# Patient Record
Sex: FEMALE | Race: WHITE | Employment: FULL TIME | URBAN - METROPOLITAN AREA
[De-identification: names, ages, dates, MRNs, and addresses within clinical notes are randomized per-mention and may not be internally consistent; named-entity substitution may affect disease eponyms.]

---

## 2017-07-13 ENCOUNTER — OFFICE VISIT (OUTPATIENT)
Dept: ORTHOPEDIC SURGERY | Age: 52
End: 2017-07-13

## 2017-07-13 VITALS
WEIGHT: 125 LBS | HEART RATE: 65 BPM | SYSTOLIC BLOOD PRESSURE: 132 MMHG | HEIGHT: 63 IN | DIASTOLIC BLOOD PRESSURE: 78 MMHG | BODY MASS INDEX: 22.15 KG/M2

## 2017-07-13 DIAGNOSIS — M75.31 CALCIFIC TENDINITIS OF RIGHT SHOULDER: ICD-10-CM

## 2017-07-13 DIAGNOSIS — M25.511 ACUTE PAIN OF RIGHT SHOULDER: Primary | ICD-10-CM

## 2017-07-13 PROCEDURE — 99213 OFFICE O/P EST LOW 20 MIN: CPT | Performed by: ORTHOPAEDIC SURGERY

## 2017-07-13 RX ORDER — MELOXICAM 15 MG/1
15 TABLET ORAL DAILY
Qty: 90 TABLET | Refills: 3 | Status: SHIPPED | OUTPATIENT
Start: 2017-07-13 | End: 2021-01-20 | Stop reason: ALTCHOICE

## 2017-07-20 ENCOUNTER — OFFICE VISIT (OUTPATIENT)
Dept: ORTHOPEDIC SURGERY | Age: 52
End: 2017-07-20

## 2017-07-20 VITALS
HEART RATE: 63 BPM | WEIGHT: 125 LBS | SYSTOLIC BLOOD PRESSURE: 132 MMHG | HEIGHT: 63 IN | BODY MASS INDEX: 22.15 KG/M2 | DIASTOLIC BLOOD PRESSURE: 78 MMHG

## 2017-07-20 DIAGNOSIS — M75.31 CALCIFIC TENDINITIS OF RIGHT SHOULDER: Primary | ICD-10-CM

## 2017-07-20 PROCEDURE — 99213 OFFICE O/P EST LOW 20 MIN: CPT | Performed by: ORTHOPAEDIC SURGERY

## 2017-07-20 RX ORDER — DICLOFENAC SODIUM 75 MG/1
75 TABLET, DELAYED RELEASE ORAL 2 TIMES DAILY
Qty: 60 TABLET | Refills: 3 | Status: SHIPPED | OUTPATIENT
Start: 2017-07-20 | End: 2018-08-09

## 2018-01-17 ENCOUNTER — OFFICE VISIT (OUTPATIENT)
Dept: ORTHOPEDIC SURGERY | Age: 53
End: 2018-01-17

## 2018-01-17 VITALS
HEART RATE: 63 BPM | HEIGHT: 63 IN | SYSTOLIC BLOOD PRESSURE: 131 MMHG | BODY MASS INDEX: 22.15 KG/M2 | WEIGHT: 125 LBS | DIASTOLIC BLOOD PRESSURE: 78 MMHG

## 2018-01-17 DIAGNOSIS — S63.8X2A SPRAIN OF CARPOMETACARPAL JOINT OF LEFT HAND, INITIAL ENCOUNTER: ICD-10-CM

## 2018-01-17 DIAGNOSIS — M79.642 PAIN OF LEFT HAND: Primary | ICD-10-CM

## 2018-01-17 PROCEDURE — 99213 OFFICE O/P EST LOW 20 MIN: CPT | Performed by: ORTHOPAEDIC SURGERY

## 2018-01-17 RX ORDER — NAPROXEN 500 MG/1
500 TABLET ORAL 2 TIMES DAILY WITH MEALS
Qty: 60 TABLET | Refills: 3 | Status: SHIPPED | OUTPATIENT
Start: 2018-01-17 | End: 2021-01-20 | Stop reason: ALTCHOICE

## 2018-01-17 NOTE — PROGRESS NOTES
Other Topics Concern    Not on file     Social History Narrative    No narrative on file       Family HISTORY    Family History   Problem Relation Age of Onset    High Blood Pressure Father        PHYSICAL EXAM    Vital Signs:  /78 (Site: Left Arm, Position: Sitting)   Pulse 63   Ht 5' 2.99\" (1.6 m)   Wt 125 lb (56.7 kg)   BMI 22.15 kg/m²   General Appearance:  Normal body habitus. Alert and oriented to person, place, and time. Affect:  Normal.   Gait:  Normal. Good balance and coordination. Reflexes:  Intact. Pulses:  Normal.   Skin:  Normal.     Wrist Exam  Hand dominance - Right  Surface Exam  there is some local swelling at the [facility]     1st Dorsal Compartment:  Finkelstein's test:  Negative. [facility] Joint:  Positive for tenderness. Grind test:  Negative. Scaphoid snuffbox:  Nontender. Volar wrist:  Flexor tendons:  Normal.   Marie's test:  Normal.     Dorsal wrist:   No swelling. Extensor tendons normal.     Ulnar side:   ECU tendon:  Normal.   Shuck test:  Negative. Hypothenar snuffbox:  Nontender. LT ligament:  Nontnender. TFCC:  Nontender  No pain. No instability. Wrist Motion Right Left   DF     PF     RD     CMC     UD     SUP     PRO       Strength Right Left    Strength     Allen Pinch          CMC       Hand Exam:  Neurologic Exam:  Reflexes:  Normal.   Motor testing:  Intact in the median, ulnar and radial nerve distributions. Sensation:  Intact in the median, ulnar and radial nerve distributions. Atrophy:  None. Phalen's:  Negative. Median Nerve Compression:  None. Elbow Flexion Test:  Negative. Thumb Exam:  Grind Test:  Negative. Finkelstein's:  Negative. Triggering:  None      Finger Exam:  Range of Motion:  Normal.   Triggering:  None.      Finger/Thumb Range of Motion      Thumb Movement Right Left   IP Extension     IP Flexion     MP Extension     CMC     CMC Radial Abduction     CMC Adduction     CMC Opposition           Index Finger   Range of Motion Right Left   DIP Extension     DIP Flexion     PIP Extension     CMC     PIP Flexion     MP Extension     MP Flexion       Middle Finger   Range of Motion Right Left   DIP Extension     DIP Flexion     PIP Extension     CMC     PIP Flexion     MP Extension     MP Flexion       Ring Finger   Range of Motion Right Left   DIP Extension     DIP Flexion     PIP Extension     CMC     PIP Flexion     MP Extension     MP Flexion       Little Finger   Range of Motion Right Left   DIP Extension     DIP Flexion     PIP Extension     CMC     PIP Flexion     MP Extension     MP Flexion         IMAGING STUDIES    AP navicular lateral views of the left hand and wrist.  These demonstrate radial subluxation of the metacarpal relative to the trapezium. These findings are suggestive of [facility] strain    IMPRESSION    Left CMC  strain of the thumb    PLAN    1. Conservative care options including physical therapy, NSAIDs, splinting, and activity modification were discussed. 2.  The indications for therapeutic injections were discussed. 3.  The indications for additional imaging studies were discussed. 4.  After considering the various options discussed, the patient elected to pursue a course that includes the patient will be fitted with a thumb spica splint which she'll wear for the course next 6 weeks to stabilize the joint. We'll also give her a prescription for  naproxen 500 mg p.o. b.i.d. and then we will reevaluate her in 6 weeks' time to see how she's responded to these measures.

## 2018-01-17 NOTE — PATIENT INSTRUCTIONS
Patient Education        Hand Pain: Care Instructions  Your Care Instructions    Common causes of hand pain are overuse and injuries, such as might happen during sports or home repair projects. Everyday wear and tear, especially as you get older, also can cause hand pain. Most minor hand injuries will heal on their own, and home treatment is usually all you need to do. If you have sudden and severe pain, you may need tests and treatment. Follow-up care is a key part of your treatment and safety. Be sure to make and go to all appointments, and call your doctor if you are having problems. It's also a good idea to know your test results and keep a list of the medicines you take. How can you care for yourself at home? · Take pain medicines exactly as directed. ¨ If the doctor gave you a prescription medicine for pain, take it as prescribed. ¨ If you are not taking a prescription pain medicine, ask your doctor if you can take an over-the-counter medicine. · Rest and protect your hand. Take a break from any activity that may cause pain. · Put ice or a cold pack on your hand for 10 to 20 minutes at a time. Put a thin cloth between the ice and your skin. · Prop up the sore hand on a pillow when you ice it or anytime you sit or lie down during the next 3 days. Try to keep it above the level of your heart. This will help reduce swelling. · If your doctor recommends a sling, splint, or elastic bandage to support your hand, wear it as directed. When should you call for help? Call 911 anytime you think you may need emergency care. For example, call if:  ? · Your hand turns cool or pale or changes color. ?Call your doctor now or seek immediate medical care if:  ? · You cannot move your hand. ? · Your hand pops, moves out of its normal position, and then returns to its normal position. ? · You have signs of infection, such as:  ¨ Increased pain, swelling, warmth, or redness.   ¨ Red streaks leading from the sore

## 2018-08-09 ENCOUNTER — OFFICE VISIT (OUTPATIENT)
Dept: ORTHOPEDIC SURGERY | Age: 53
End: 2018-08-09

## 2018-08-09 VITALS
SYSTOLIC BLOOD PRESSURE: 118 MMHG | WEIGHT: 130 LBS | HEART RATE: 79 BPM | DIASTOLIC BLOOD PRESSURE: 73 MMHG | BODY MASS INDEX: 23.04 KG/M2 | HEIGHT: 63 IN

## 2018-08-09 DIAGNOSIS — M25.521 RIGHT ELBOW PAIN: Primary | ICD-10-CM

## 2018-08-09 DIAGNOSIS — M77.11 RIGHT LATERAL EPICONDYLITIS: ICD-10-CM

## 2018-08-09 PROCEDURE — MISCD83 BANDIT: Performed by: ORTHOPAEDIC SURGERY

## 2018-08-09 PROCEDURE — 99203 OFFICE O/P NEW LOW 30 MIN: CPT | Performed by: ORTHOPAEDIC SURGERY

## 2018-08-09 NOTE — PROGRESS NOTES
expected resolution. Today we will supply her with a tennis elbow forearm strap. With her activity level I feel confident that with simple activity changes she will be able to get this to resolve even though it may take several months. She will be very particularly careful when working with her horses. Appropriate followup plans are discussed with the patient depending on the level of progress with the conservative care. Procedures    Bandit $30     Patient was supplied a Band-It Strap. This retail item was supplied to provide functional support and assist in protecting the affected area. Verbal and written instructions for the use of and application of this item were provided. The patient was educated and fit by a healthcare professional with expert knowledge and specialization in brace application. They were instructed to contact the office immediately should the equipment result in increased pain, decreased sensation, increased swelling or worsening of the condition.

## 2020-10-06 ENCOUNTER — OFFICE VISIT (OUTPATIENT)
Dept: ORTHOPEDIC SURGERY | Age: 55
End: 2020-10-06
Payer: COMMERCIAL

## 2020-10-06 VITALS — WEIGHT: 130 LBS | BODY MASS INDEX: 23.04 KG/M2 | HEIGHT: 63 IN

## 2020-10-06 PROCEDURE — 99213 OFFICE O/P EST LOW 20 MIN: CPT | Performed by: ORTHOPAEDIC SURGERY

## 2020-10-06 PROCEDURE — L3924 HFO WITHOUT JOINTS PRE OTS: HCPCS | Performed by: ORTHOPAEDIC SURGERY

## 2020-10-06 NOTE — PROGRESS NOTES
Chief Complaint   Patient presents with    Hand Pain     LEFT  THUMB PAIN/ARTHRITIS, INJURY IN PAST, FELL IN BARN. HISTORY OF PRESENT Marcia Smith is a  right-hand-dominant patient here for evaluation of pain in her Left thumb, at the MCP joint, that began approximately 2.5 years ago. Patient fell in a barn landing awkwardly on her left thumb, she saw an orthopedist at that point who suggested that she may have sprained the thumb and may have some arthritis in the thumb. She was managed nonoperatively. She has tried conservative measures since that point with use of a brace intermittently, activity modifications and NSAIDs. She states that she is never been able to regain the strength of  or pinching with the thumb. She denies triggering. ROS:  ROS neg except for positives in HPI    PHYSICAL EXAMINATION:   Gen/Psych: Examination reveals a pleasant individual in no acute distress. The patient is oriented to time, place and person. The patient's mood and affect are appropriate. Lymph: The lymphatic examination bilaterally reveals all areas to be without enlargement or induration. Skin intact without lymphadenopathy, discoloration, or abnormal temperature. Vascular: There is intact, symmetric circulation in both upper extremities. Musculoskeletal       Cervical spine, shoulders, elbows, wrist:  satisfactory pain-free range of motion,                                                                           strength, and stability        Left thumb:  Tenderness is elicited with palpation of the MP joint, especially ulnarly. Stressing of the MP joint reveals laxity ulnarly consistent with UCL tear or laxity. Unable to assess full endpoint and this re-creates her pain. No pain radially at the MP joint. No pain at the thumb [facility] joint today. Negative grind test.  Negative Finkelstein test.  No thumb triggering.       DIAGNOSTIC TESTING:   3 views, left hand, impression:  Moderate narrowing thumb CMC joint, small amount of subluxation. 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 Procedures    Hely Weber [facility] Controller Plus     Patient was prescribed a Hely Weber [facility] Controller Plus Thumb Splint. The left thumb will require stabilization / immobilization from this semi-rigid / rigid orthosis to improve their function. The orthosis will assist in protecting the affected area, provide functional support and facilitate healing. The patient was educated and fit by a healthcare professional with expert knowledge and specialization in brace application while under the direct supervision of the physician. Verbal and written instructions for the use of and application of this item were provided. They were instructed to contact the office immediately should the brace result in increased pain, decreased sensation, increased swelling or worsening of the condition. In addition MRI left hand, specifically the thumb to evaluate for UCL tear at the MCP joint, rule out Stener lesion. Follow-up after MRI. All questions and concerns were addressed today. Patient is in agreement with the plan. Ree Edgar MD  Hand & Upper Extremity Surgery  [address]  A partner of [facility] ([facility])        Please note that this transcription was created using voice recognition software. Any errors are unintentional and may be due to voice recognition transcription.

## 2020-10-29 ENCOUNTER — TELEPHONE (OUTPATIENT)
Dept: ORTHOPEDIC SURGERY | Age: 55
End: 2020-10-29

## 2020-10-29 NOTE — TELEPHONE ENCOUNTER
General Question     Subject: MRI ORDERS  Patient and /or Facility Request: Vanessa Enriquez  Contact Number: [phone]

## 2020-11-05 ENCOUNTER — TELEPHONE (OUTPATIENT)
Dept: ORTHOPEDIC SURGERY | Age: 55
End: 2020-11-05

## 2020-12-12 ENCOUNTER — OFFICE VISIT (OUTPATIENT)
Dept: ORTHOPEDIC SURGERY | Age: 55
End: 2020-12-12
Payer: COMMERCIAL

## 2020-12-12 VITALS — WEIGHT: 135 LBS | HEIGHT: 63 IN | BODY MASS INDEX: 23.92 KG/M2

## 2020-12-12 PROCEDURE — 99213 OFFICE O/P EST LOW 20 MIN: CPT | Performed by: NURSE PRACTITIONER

## 2020-12-12 NOTE — PROGRESS NOTES
Subjective    Patient ID: Kennedy Dodge is a 50 y.o..  female. Shoulder Pain: Patient complaints of right shoulder pain. The patient reports that she woke up Thursday night with severe shoulder pain in the middle of the night. Earlier that day, horse had yanked her arm but she is unsure if that was related. She did not have pain at the time of that injury. She reports that the shoulder pain is global but most severe posteriorly. She denies any pain radiating down her arm and denies any numbness or tingling. She is unsure if she has any weakness in the arm as she has avoided using this shoulder and arm. She has been taking ibuprofen as well as using ice with minimal relief. She is right-handed. 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 Pain Assessment  Location of Pain: Shoulder  Location Modifiers: Right  Severity of Pain: 10  Quality of Pain: Sharp, Aching  Duration of Pain: Persistent  Frequency of Pain: Constant  Date Pain First Started: 12/10/20  Aggravating Factors: Other (Comment)(movement)  Limiting Behavior: Yes  Relieving Factors: Rest, Ice  Result of Injury: Yes  Work-Related Injury: No  Are there other pain locations you wish to document?: No    Patient's medications, allergies, past medical, surgical, social and family histories were reviewed and updated as appropriate. Physical Exam  Constitutional:  Pt well groomed, no acute distress, well developed, no obvious deformities  Vitals:    12/12/20 1103   Weight: 135 lb (61.2 kg)   Height: 5' 3\" (1.6 m)     -Oriented to person, place, and time  -mood and affect are appropriate    Shoulder Exam:  Examination of the right shoulder shows:  -There is not a deformity.    -There is not erythema.    -There is not soft tissue swelling.    -Deltoid region is not tender to palpation. AC Joint is not tender to palpation. Clavicle is not tender to palpation. Bicipital Groove is  tender to palpation. Pectoralis  is not tender to palpation. Scapula/ trapezius is  tender to palpation.  -Difficult to discern if patient has shoulder weakness with rotator cuff testing or limitations due to pain  -There is  pain with supraspinatus testing.   -Shoulder ROM -full range of motion external rotation, internal rotation abduction and adduction. Forward flexion limited significantly due to pain, patient able to tolerate passive forward flexion to approximately 70 degrees, difficulty maintaining this position due to pain    Contralateral Exam:  -No obvious deformities  -No abrasions or cellulitis noted, NVI   -Full ROM   -No joint laxity  -no palpable tenderness noted    Neurological:   -There is not any complaints of numbness and tingling.    -Motor and sensory is at median, radial and ulnar nerve distributions. -NVI to upper extremities bilaterally. Skin:  No abrasions, lesions, cellulitic changes, obvious deformities noted     Xray:  4 view (AP/Lat/Axillary/Y) of right shoulder show: No acute fractures or deformities; osteophyte noted    Assessment:  right shoulder strain    Plan: I discussed treatment options with the patient including pursuing an MRI at this time due to a possible acute injury with related severe pain. Another option that we discussed was conservative treatment. After discussion, the patient and I decided upon pursuing a conservative course of treatment including a prescription for Voltaren, physical therapy, rest and ice. She will follow up with Dr. Nivia Valladares in 2 weeks. I did discuss with her that if her pain does not improve with this conservative treatment or worsens to contact us and at that time we would likely pursue an MRI to rule out a rotator cuff tear. No orders of the defined types were placed in this encounter.

## 2020-12-28 ENCOUNTER — OFFICE VISIT (OUTPATIENT)
Dept: ORTHOPEDIC SURGERY | Age: 55
End: 2020-12-28
Payer: COMMERCIAL

## 2020-12-28 VITALS — BODY MASS INDEX: 24.84 KG/M2 | WEIGHT: 135 LBS | HEIGHT: 62 IN

## 2020-12-28 PROCEDURE — 99213 OFFICE O/P EST LOW 20 MIN: CPT | Performed by: ORTHOPAEDIC SURGERY

## 2020-12-28 NOTE — PROGRESS NOTES
I am evaluating this patient as a consult at the request of LORNA Rosa - CNP  [address],  [address] [place] [address]     Chief Complaint:  New Patient [facility] RIGHT SHOULDER )      History of Present Illness:  Ortiz Ahmadi is a  50 y.o. right hand dominant female here regarding 2 weeks of right shoulder pain, [place] 2 weeks ago horse pulled her arm jerking away from her body, she woke the next day with significant pain and stiffness, over the last 2 weeks range of motion has improved but she is continued to have pain especially with overhead activity. Denies numbness and tingling down the arm. She does have history of right shoulder video arthroscopy with distal clavicle excision by Dr. Elier Powell quite a few years ago. Denies previous rotator cuff repair. She was evaluated at after-hours clinic where she was provided diclofenac and a home exercise program.  Patient has been doing exercises and taking medicine with improvement in range of motion but not pain or strength. 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   Past Surgical History:   Procedure Laterality Date    ANTERIOR CRUCIATE LIGAMENT REPAIR      right     SHOULDER ARTHROSCOPY  4/26/12    right shoulder subacromial decompression, right shoulder distal clavicle resection     Social History     Socioeconomic History    Marital status:      Spouse name: None    Number of children: None    Years of education: None    Highest education level: None   Occupational History    None   Social Needs    Financial resource strain: None    Food insecurity     Worry: None     Inability: None    Transportation needs     Medical: None     Non-medical: None   Tobacco Use    Smoking status: Current Every Day Smoker     Packs/day: 0.50     Years: 20.00     Pack years: 10.00     Types: Cigarettes    Smokeless tobacco: Never Used   Substance and Sexual Activity    Alcohol use: Yes     Comment: rarely    Drug use: No    Sexual activity: Yes     Partners: Male   Lifestyle    Physical activity     Days per week: None     Minutes per session: None    Stress: None   Relationships    Social connections     Talks on phone: None     Gets together: None     Attends [employer] service: None     Active member of club or organization: None     Attends meetings of clubs or organizations: None     Relationship status: None    Intimate partner violence     Fear of current or ex partner: None     Emotionally abused: None     Physically abused: None     Forced sexual activity: None   Other Topics Concern    None   Social History Narrative    None     Allergies   Allergen Reactions    Cephalexin     Keflex [Cephalexin]        Review of Systems:  Constitutional: negative  Respiratory: negative  Cardiovascular: negative  Musculoskeletal:negative except for New Patient [facility]ISE [facility] RIGHT SHOULDER )    Relevant review of systems reviewed and available in the patient's chart in media tab    Vital Signs:  Vitals:    12/28/20 1259   Weight: 135 lb (61.2 kg)   Height: 5' 2\" (1.575 m)         General Exam:   Constitutional: Patient is adequately groomed with no evidence of malnutrition  Vascular: Examination reveals no swelling or calf tenderness. Peripheral pulses are palpable and 2+. Neurological: The patient has good coordination. There is no weakness or sensory deficit. PHYSICAL EXAMINATION: Inspection of the right shoulder reveals warm, dry, intact skin. There is no adenopathy. The distal neurovascular exam is grossly intact.   Range of motion is 180 degrees of active forward flexion, functional external rotation to her occiput, functional internal rotation T12. External rotation with arm at her side about 45 degrees. She has a positive empty can test significant for both pain and weakness with supraspinatus testing. No pain or weakness with subscapularis or infraspinatus testing. No tenderness to palpation in the bicipital groove, negative speeds and Yergason's. Strength is graded 5/5 in all other muscle groups. Examination of the contralateral shoulder reveals no atrophy or deformity. The skin is warm and dry. Range of motion is within normal limits. There is no focal tenderness with palpation. Provocative SLAP, biceps tension, apprehension AC joint or rotator cuff tests are negative. Strength is graded 5/5 in all muscle groups. The distal neurovascular exam is grossly intact. Cervical spine: The skin is warm and dry. There is no swelling, warmth, or erythema. Range of motion is within normal limits. There is no paraspinal or spinous process tenderness. Spurling's sign is negative and did not produce shoulder pain. The distal neurovascular exam is grossly intact. Additional Comments: Sensation is intact to light touch throughout the median, ulnar and radial nerve distribution. Able to wiggle fingers, give thumbs up, A-OK and cross index and middle fingers. X-RAYS:  Four views of the right shoulder are reviewed. There is no periosteal reaction, medullary lesions, or osteopenia. Glenohumeral space is well maintained, the acromioclavicular joint space shows previous Jessie procedure. The Acromiohumeral space is well-maintained. Calcific tendinosis at the insertion of the supraspinatus on the greater tuberosity is visible. She has a type I acromion from previous subacromial decompression. the lung fields are clear. Assessment: Right shoulder injury concerning for rotator cuff tear, calcific tendinosis    Impression:  Encounter Diagnosis   Name Primary?  Acute pain of right shoulder Yes       Office Procedures:  Orders Placed This Encounter   Procedures    MRI SHOULDER RIGHT WO CONTRAST     Standing Status:   Future     Standing Expiration Date:   12/28/2021     Scheduling Instructions:      Katherine Goetz     No orders of the defined types were placed in this encounter. Treatment Plan: Based on patient's history and physical exam I'm concerned about rotator cuff tear therefore I would like to obtain an MRI to further evaluate. Once the MRI is obtained the patient will follow up with me for further evaluation and discussion. Patient agrees with this plan, all of their questions were answered best of our ability and to their satisfaction. Continue with home exercise program and diclofenac until after MRI.       Timothy Ybarra

## 2021-01-07 ENCOUNTER — OFFICE VISIT (OUTPATIENT)
Dept: ORTHOPEDIC SURGERY | Age: 56
End: 2021-01-07
Payer: COMMERCIAL

## 2021-01-07 VITALS — WEIGHT: 135 LBS | BODY MASS INDEX: 24.84 KG/M2 | HEIGHT: 62 IN

## 2021-01-07 DIAGNOSIS — M25.562 LEFT KNEE PAIN, UNSPECIFIED CHRONICITY: Primary | ICD-10-CM

## 2021-01-07 PROCEDURE — 99213 OFFICE O/P EST LOW 20 MIN: CPT | Performed by: ORTHOPAEDIC SURGERY

## 2021-01-07 NOTE — PROGRESS NOTES
I am evaluating this patient as a consult at the request of No referring provider defined for this encounter. Chief Complaint:  Knee Pain (CK LEFT KNEE)      History of Present Illness:  Bishop Aranda is a 50 y.o. female here regarding left knee injury, on January 4 she was cleaning horse stalls when she twisted awkwardly felt a snap on the medial aspect of her left knee. She now has pain with twisting and squatting. It is sharp in nature. She has been taking ibuprofen, using ice and avoiding aggravating activities with no significant improvement. She has swelling as well. Pain Assessment:       Medical History:  No past medical history on file.   Past Surgical History:   Procedure Laterality Date    ANTERIOR CRUCIATE LIGAMENT REPAIR      right     SHOULDER ARTHROSCOPY  4/26/12    right shoulder subacromial decompression, right shoulder distal clavicle resection     Social History     Socioeconomic History    Marital status:      Spouse name: None    Number of children: None    Years of education: None    Highest education level: None   Occupational History    None   Social Needs    Financial resource strain: None    Food insecurity     Worry: None     Inability: None    Transportation needs     Medical: None     Non-medical: None   Tobacco Use    Smoking status: Current Every Day Smoker     Packs/day: 0.50     Years: 20.00     Pack years: 10.00     Types: Cigarettes    Smokeless tobacco: Never Used   Substance and Sexual Activity    Alcohol use: Yes     Comment: rarely    Drug use: No    Sexual activity: Yes     Partners: Male   Lifestyle    Physical activity     Days per week: None     Minutes per session: None    Stress: None   Relationships    Social connections     Talks on phone: None     Gets together: None     Attends [employer] service: None     Active member of club or organization: None     Attends meetings of clubs or organizations: None     Relationship status: None  Intimate partner violence     Fear of current or ex partner: None     Emotionally abused: None     Physically abused: None     Forced sexual activity: None   Other Topics Concern    None   Social History Narrative    None     Allergies   Allergen Reactions    Cephalexin     Keflex [Cephalexin]        Review of Systems:  Constitutional: negative  Respiratory: negative  Cardiovascular: negative  Musculoskeletal:negative except for Knee Pain (CK LEFT KNEE)    Relevant review of systems reviewed and available in the patient's chart in media tab    Vital Signs:  Vitals:    01/07/21 1340   Weight: 135 lb (61.2 kg)   Height: 5' 2\" (1.575 m)         General Exam:   Constitutional: Patient is adequately groomed with no evidence of malnutrition  Mental Status: The patient is oriented to time, place and person. The patient's mood and affect are appropriate. Vascular: Examination reveals no swelling or calf tenderness. Peripheral pulses are palpable and 2+. left Knee Examination  Inspection:   No gross deformities noted. mild swelling noted. No erythema or ecchymosis. Skin is intact. Palpation: positive Tenderness to palpation along the medial joint line, negative Tenderness to palpation along lateral joint line, positive Tenderness to palpation along medial and lateral facets of undersurface of the patella    Range of Motion:  0-125    Strength:  Able to do SLR    Special Tests:  ACL, MCL, PCL, LCL are intact with stress exam.  There negative crepitus noted with range of motion under the patella. A positive grind test.  positive Tracy's and Apley compression test.       Skin: There are no rashes, ulcerations or lesions. Gait: Normal gait    Reflex: not tested    Additional Examinations:  Right Lower Extremity: Examination of the right lower extremity does not show any tenderness, deformity or injury. Range of motion is unremarkable. There is no gross instability.   There are no rashes, ulcerations or lesions. Strength and tone are normal.      LUMBAR SPINE: The skin is warm and dry. There is no swelling, warmth, or erythema. Range of motion is within normal limits. There is no paraspinal or spinous process tenderness. Ipsilateral and contralateral straight leg raising tests are negative. The distal neurovascular exam is grossly intact and symmetric. X-RAYS: 4 views weightbearing AP, lateral. PA and sunrise of the left knee were obtained and reviewed, they show no periosteal reaction, medullary lesions, or osteopenia. Joint spaces are well maintained. No evidence of fracture or dislocation. Assessment : Left knee pain concerning for medial meniscus tear    Impression:  Encounter Diagnosis   Name Primary?  Left knee pain, unspecified chronicity Yes       Office Procedures:  Orders Placed This Encounter   Procedures    XR KNEE LEFT (MIN 4 VIEWS)     Order Specific Question:   Reason for exam:     Answer:   LEFT KNEE PAIN     No orders of the defined types were placed in this encounter. Treatment Plan: Based on patient's history and physical exam I'm concerned about medial meniscus tear therefore I would like to obtain an MRI to further evaluate, her PCP has already ordered an MRI and she has that scheduled for this weekend. Once the MRI is obtained the patient will follow up with me for further evaluation and discussion. Patient agrees with this plan, all of their questions were answered best of our ability and to their satisfaction.           Estelita Orantes

## 2021-01-12 ENCOUNTER — OFFICE VISIT (OUTPATIENT)
Dept: ORTHOPEDIC SURGERY | Age: 56
End: 2021-01-12
Payer: COMMERCIAL

## 2021-01-12 VITALS — BODY MASS INDEX: 24.84 KG/M2 | WEIGHT: 135 LBS | HEIGHT: 62 IN

## 2021-01-12 DIAGNOSIS — M67.52 PLICA SYNDROME, LEFT KNEE: Primary | ICD-10-CM

## 2021-01-12 PROCEDURE — L1812 KO ELASTIC W/JOINTS PRE OTS: HCPCS | Performed by: ORTHOPAEDIC SURGERY

## 2021-01-12 PROCEDURE — 99214 OFFICE O/P EST MOD 30 MIN: CPT | Performed by: ORTHOPAEDIC SURGERY

## 2021-01-12 NOTE — PROGRESS NOTES
Chief Complaint:  Follow-up (TR MRI ON 1/9)      SUBJECTIVE:  Celi Barrios is a 50 y.o. female who returns today to review MRI results of the left knee, MRI was obtained by her PCP and she is here to review those results. on January 4 she was cleaning horse stalls when she twisted awkwardly felt a snap on the medial aspect of her left knee. She now has pain with twisting and squatting. It is sharp in nature. Continues to have 4 out of 10 sharp anterior pain. Pain Assessment:         Medical History:  No past medical history on file. Past Surgical History:   Procedure Laterality Date    ANTERIOR CRUCIATE LIGAMENT REPAIR      right     SHOULDER ARTHROSCOPY  4/26/12    right shoulder subacromial decompression, right shoulder distal clavicle resection     Current Outpatient Medications   Medication Sig Dispense Refill    diclofenac (VOLTAREN) 50 MG EC tablet Take 1 tablet by mouth 2 times daily (with meals) 60 tablet 0    naproxen (NAPROSYN) 500 MG tablet Take 1 tablet by mouth 2 times daily (with meals) 60 tablet 3    meloxicam (MOBIC) 15 MG tablet Take 1 tablet by mouth daily (Patient not taking: Reported on 10/6/2020) 90 tablet 3     No current facility-administered medications for this visit. Allergies   Allergen Reactions    Cephalexin     Keflex [Cephalexin]        Review of Systems:  Constitutional: negative  Respiratory: negative  Cardiovascular: negative  Musculoskeletal:negative except for Follow-up (TR MRI ON 1/9)    Relevant review of systems reviewed and available in the patient's chart in media tab    General Exam:   Constitutional: Patient is adequately groomed with no evidence of malnutrition  Mental Status: The patient is oriented to time, place and person. The patient's mood and affect are appropriate. Vascular: Examination reveals no swelling or calf tenderness. Peripheral pulses are palpable and 2+.     OBJECTIVE:  Vital Signs:  Vitals:    01/12/21 1349   Weight: 135 lb (61.2 kg)   Height: 5' 2\" (1.575 m)       Appearance: alert, well appearing, and in no distress, oriented to person, place, and time and normal appearing weight. Physical exam:   Continues to have tenderness to palpation on the medial and anterior aspect of the knee, she does have mechanical symptoms with plica palpable on flexion extension. Positive grind test, positive Tracy's. Tolerates range of motion 0 to 130 degrees. Ambulates with a normal gait. MCL is stable with stress exam but tender. Distal neurovascular exam is intact. X-RAYS: 4 views weightbearing AP, lateral. PA and sunrise of the left knee were reviewed, they show no periosteal reaction, medullary lesions, or osteopenia. Joint spaces are well maintained. No evidence of fracture or dislocation. MRI images of the left knee were reviewed and show:  Hypertrophic medial plica and synovitis  Medial lateral meniscus are intact, no evidence of cruciate or collateral ligament tear  MCL sprain, grade 1    Assessment : Left knee medial plica syndrome, synovitis, MCL sprain    Impression:  Encounter Diagnosis   Name Primary?  Plica syndrome, left knee Yes       Office Procedures:  No orders of the defined types were placed in this encounter. No orders of the defined types were placed in this encounter. Treatment Plan: I had a lengthy discussion with the patient about the pathophysiology of medial plica syndrome and synovitis and their treatment options. The 1st option would be to pursue no further treatment and continue living with their knee pain and dysfunction. The 2nd treatment option would be to pursue conservative treatment, including physical therapy, injections and oral medications.  The 3rd option would be to pursue surgical intervention including left knee video arthroscopy excision of medial plica and partial synovectomy, evaluation of chondral and meniscal surfaces with work as needed  Risks and benefits of each option were discussed in great detail with the patient, at this time the patient would like to pursue surgical intervention. I spent 25+ minutes face to face with the patient including 13+ minutes discussing and answering questions regarding the risks, benefits, and complications of left knee video arthroscopy excision of medial plica and partial synovectomy, evaluation of chondral and meniscal surfaces with work as needed in detail. We talked about the risks of surgery which include but are not limited to infection, bleeding, nerve injury resulting in motor or sensory loss, DVT, RSD, persistent pain, loss of motion, functional instability, and need for further surgery. At no time were any guarantees implied or stated. The patient acknowledged these risks and electively reviewed and signed the consent form. Surgery will be scheduled for a mutually convenient time. Patient will obtain medical clearance from their PCP prior to surgery. An economy hinged brace was provided today to assist in stability while she heals from the MCL sprain.     Diane Shook

## 2021-01-13 DIAGNOSIS — M67.52 PLICA SYNDROME, LEFT KNEE: Primary | ICD-10-CM

## 2021-01-20 ENCOUNTER — TELEPHONE (OUTPATIENT)
Dept: ORTHOPEDIC SURGERY | Age: 56
End: 2021-01-20

## 2021-01-25 ENCOUNTER — HOSPITAL ENCOUNTER (OUTPATIENT)
Age: 56
Discharge: HOME | End: 2021-01-25
Payer: COMMERCIAL

## 2021-01-25 LAB — SARS-COV-2, NAAT: NOT DETECTED

## 2021-01-25 PROCEDURE — U0002 COVID-19 LAB TEST NON-CDC: HCPCS

## 2021-01-25 NOTE — PROGRESS NOTES
Contacted pt concerning covid testing, instructed to go to [facility] to get testing done

## 2021-01-27 ENCOUNTER — ANESTHESIA (OUTPATIENT)
Dept: OPERATING ROOM | Age: 56
End: 2021-01-27
Payer: COMMERCIAL

## 2021-01-27 ENCOUNTER — HOSPITAL ENCOUNTER (OUTPATIENT)
Age: 56
Setting detail: OUTPATIENT SURGERY
Discharge: HOME | End: 2021-01-27
Attending: ORTHOPAEDIC SURGERY | Admitting: ORTHOPAEDIC SURGERY
Payer: COMMERCIAL

## 2021-01-27 ENCOUNTER — ANESTHESIA EVENT (OUTPATIENT)
Dept: OPERATING ROOM | Age: 56
End: 2021-01-27
Payer: COMMERCIAL

## 2021-01-27 VITALS
BODY MASS INDEX: 24.84 KG/M2 | SYSTOLIC BLOOD PRESSURE: 132 MMHG | HEART RATE: 72 BPM | WEIGHT: 135 LBS | DIASTOLIC BLOOD PRESSURE: 78 MMHG | HEIGHT: 62 IN | TEMPERATURE: 98 F | OXYGEN SATURATION: 97 % | RESPIRATION RATE: 16 BRPM

## 2021-01-27 VITALS
TEMPERATURE: 98.2 F | DIASTOLIC BLOOD PRESSURE: 54 MMHG | RESPIRATION RATE: 11 BRPM | SYSTOLIC BLOOD PRESSURE: 88 MMHG | OXYGEN SATURATION: 100 %

## 2021-01-27 DIAGNOSIS — M67.52 PLICA SYNDROME, LEFT KNEE: Primary | ICD-10-CM

## 2021-01-27 PROCEDURE — 2709999900 HC NON-CHARGEABLE SUPPLY: Performed by: ORTHOPAEDIC SURGERY

## 2021-01-27 PROCEDURE — 2500000003 HC RX 250 WO HCPCS: Performed by: NURSE ANESTHETIST, CERTIFIED REGISTERED

## 2021-01-27 PROCEDURE — 6370000000 HC RX 637 (ALT 250 FOR IP): Performed by: ANESTHESIOLOGY

## 2021-01-27 PROCEDURE — 2720000010 HC SURG SUPPLY STERILE: Performed by: ORTHOPAEDIC SURGERY

## 2021-01-27 PROCEDURE — 7100000001 HC PACU RECOVERY - ADDTL 15 MIN: Performed by: ORTHOPAEDIC SURGERY

## 2021-01-27 PROCEDURE — 6370000000 HC RX 637 (ALT 250 FOR IP)

## 2021-01-27 PROCEDURE — 7100000000 HC PACU RECOVERY - FIRST 15 MIN: Performed by: ORTHOPAEDIC SURGERY

## 2021-01-27 PROCEDURE — 6360000002 HC RX W HCPCS: Performed by: NURSE ANESTHETIST, CERTIFIED REGISTERED

## 2021-01-27 PROCEDURE — 2500000003 HC RX 250 WO HCPCS

## 2021-01-27 PROCEDURE — 2580000003 HC RX 258: Performed by: ANESTHESIOLOGY

## 2021-01-27 PROCEDURE — 7100000011 HC PHASE II RECOVERY - ADDTL 15 MIN: Performed by: ORTHOPAEDIC SURGERY

## 2021-01-27 PROCEDURE — 7100000010 HC PHASE II RECOVERY - FIRST 15 MIN: Performed by: ORTHOPAEDIC SURGERY

## 2021-01-27 PROCEDURE — 3600000014 HC SURGERY LEVEL 4 ADDTL 15MIN: Performed by: ORTHOPAEDIC SURGERY

## 2021-01-27 PROCEDURE — 3700000000 HC ANESTHESIA ATTENDED CARE: Performed by: ORTHOPAEDIC SURGERY

## 2021-01-27 PROCEDURE — 3700000001 HC ADD 15 MINUTES (ANESTHESIA): Performed by: ORTHOPAEDIC SURGERY

## 2021-01-27 PROCEDURE — 3600000004 HC SURGERY LEVEL 4 BASE: Performed by: ORTHOPAEDIC SURGERY

## 2021-01-27 PROCEDURE — 2500000003 HC RX 250 WO HCPCS: Performed by: ORTHOPAEDIC SURGERY

## 2021-01-27 RX ORDER — LIDOCAINE HYDROCHLORIDE 10 MG/ML
2 INJECTION, SOLUTION INFILTRATION; PERINEURAL
Status: COMPLETED | OUTPATIENT
Start: 2021-01-27 | End: 2021-01-27

## 2021-01-27 RX ORDER — DEXAMETHASONE SODIUM PHOSPHATE 4 MG/ML
INJECTION, SOLUTION INTRA-ARTICULAR; INTRALESIONAL; INTRAMUSCULAR; INTRAVENOUS; SOFT TISSUE PRN
Status: DISCONTINUED | OUTPATIENT
Start: 2021-01-27 | End: 2021-01-27 | Stop reason: SDUPTHER

## 2021-01-27 RX ORDER — SODIUM CHLORIDE, SODIUM LACTATE, POTASSIUM CHLORIDE, CALCIUM CHLORIDE 600; 310; 30; 20 MG/100ML; MG/100ML; MG/100ML; MG/100ML
INJECTION, SOLUTION INTRAVENOUS CONTINUOUS
Status: DISCONTINUED | OUTPATIENT
Start: 2021-01-27 | End: 2021-01-27 | Stop reason: HOSPADM

## 2021-01-27 RX ORDER — HYDROCODONE BITARTRATE AND ACETAMINOPHEN 5; 325 MG/1; MG/1
1 TABLET ORAL EVERY 6 HOURS PRN
Qty: 28 TABLET | Refills: 0 | Status: SHIPPED | OUTPATIENT
Start: 2021-01-27 | End: 2021-02-03

## 2021-01-27 RX ORDER — OXYCODONE HYDROCHLORIDE AND ACETAMINOPHEN 5; 325 MG/1; MG/1
2 TABLET ORAL PRN
Status: COMPLETED | OUTPATIENT
Start: 2021-01-27 | End: 2021-01-27

## 2021-01-27 RX ORDER — ONDANSETRON 2 MG/ML
4 INJECTION INTRAMUSCULAR; INTRAVENOUS EVERY 30 MIN PRN
Status: DISCONTINUED | OUTPATIENT
Start: 2021-01-27 | End: 2021-01-27 | Stop reason: HOSPADM

## 2021-01-27 RX ORDER — PROPOFOL 10 MG/ML
INJECTION, EMULSION INTRAVENOUS PRN
Status: DISCONTINUED | OUTPATIENT
Start: 2021-01-27 | End: 2021-01-27 | Stop reason: SDUPTHER

## 2021-01-27 RX ORDER — CLINDAMYCIN PHOSPHATE 900 MG/50ML
900 INJECTION INTRAVENOUS EVERY 8 HOURS
Status: COMPLETED | OUTPATIENT
Start: 2021-01-27 | End: 2021-01-27

## 2021-01-27 RX ORDER — DIPHENHYDRAMINE HYDROCHLORIDE 50 MG/ML
6.25 INJECTION INTRAMUSCULAR; INTRAVENOUS
Status: DISCONTINUED | OUTPATIENT
Start: 2021-01-27 | End: 2021-01-27 | Stop reason: HOSPADM

## 2021-01-27 RX ORDER — ONDANSETRON 2 MG/ML
INJECTION INTRAMUSCULAR; INTRAVENOUS PRN
Status: DISCONTINUED | OUTPATIENT
Start: 2021-01-27 | End: 2021-01-27 | Stop reason: SDUPTHER

## 2021-01-27 RX ORDER — BUPIVACAINE HYDROCHLORIDE 2.5 MG/ML
INJECTION, SOLUTION INFILTRATION; PERINEURAL PRN
Status: DISCONTINUED | OUTPATIENT
Start: 2021-01-27 | End: 2021-01-27 | Stop reason: ALTCHOICE

## 2021-01-27 RX ORDER — BUPIVACAINE HYDROCHLORIDE 2.5 MG/ML
INJECTION, SOLUTION EPIDURAL; INFILTRATION; INTRACAUDAL
Status: DISCONTINUED
Start: 2021-01-27 | End: 2021-01-27 | Stop reason: HOSPADM

## 2021-01-27 RX ORDER — FENTANYL CITRATE 50 UG/ML
INJECTION, SOLUTION INTRAMUSCULAR; INTRAVENOUS PRN
Status: DISCONTINUED | OUTPATIENT
Start: 2021-01-27 | End: 2021-01-27 | Stop reason: SDUPTHER

## 2021-01-27 RX ORDER — LIDOCAINE HYDROCHLORIDE 10 MG/ML
INJECTION, SOLUTION EPIDURAL; INFILTRATION; INTRACAUDAL; PERINEURAL
Status: COMPLETED
Start: 2021-01-27 | End: 2021-01-27

## 2021-01-27 RX ORDER — OXYCODONE HYDROCHLORIDE AND ACETAMINOPHEN 5; 325 MG/1; MG/1
1 TABLET ORAL PRN
Status: COMPLETED | OUTPATIENT
Start: 2021-01-27 | End: 2021-01-27

## 2021-01-27 RX ORDER — HYDRALAZINE HYDROCHLORIDE 20 MG/ML
5 INJECTION INTRAMUSCULAR; INTRAVENOUS EVERY 30 MIN PRN
Status: DISCONTINUED | OUTPATIENT
Start: 2021-01-27 | End: 2021-01-27 | Stop reason: HOSPADM

## 2021-01-27 RX ORDER — LABETALOL HYDROCHLORIDE 5 MG/ML
5 INJECTION, SOLUTION INTRAVENOUS
Status: DISCONTINUED | OUTPATIENT
Start: 2021-01-27 | End: 2021-01-27 | Stop reason: HOSPADM

## 2021-01-27 RX ORDER — ACETAMINOPHEN 325 MG/1
650 TABLET ORAL EVERY 4 HOURS PRN
Status: CANCELLED | OUTPATIENT
Start: 2021-01-27

## 2021-01-27 RX ORDER — LIDOCAINE HYDROCHLORIDE 20 MG/ML
INJECTION, SOLUTION INFILTRATION; PERINEURAL PRN
Status: DISCONTINUED | OUTPATIENT
Start: 2021-01-27 | End: 2021-01-27 | Stop reason: SDUPTHER

## 2021-01-27 RX ORDER — ACETAMINOPHEN 500 MG
TABLET ORAL
Status: COMPLETED
Start: 2021-01-27 | End: 2021-01-27

## 2021-01-27 RX ADMIN — PROPOFOL 200 MG: 10 INJECTION, EMULSION INTRAVENOUS at 07:26

## 2021-01-27 RX ADMIN — DEXAMETHASONE SODIUM PHOSPHATE 10 MG: 4 INJECTION, SOLUTION INTRAMUSCULAR; INTRAVENOUS at 07:31

## 2021-01-27 RX ADMIN — LIDOCAINE HYDROCHLORIDE 0.1 ML: 10 INJECTION, SOLUTION EPIDURAL; INFILTRATION; INTRACAUDAL; PERINEURAL at 06:44

## 2021-01-27 RX ADMIN — FENTANYL CITRATE 50 MCG: 50 INJECTION INTRAMUSCULAR; INTRAVENOUS at 07:31

## 2021-01-27 RX ADMIN — CLINDAMYCIN IN 5 PERCENT DEXTROSE 900 MG: 18 INJECTION, SOLUTION INTRAVENOUS at 07:09

## 2021-01-27 RX ADMIN — ONDANSETRON 4 MG: 2 INJECTION, SOLUTION INTRAMUSCULAR; INTRAVENOUS at 07:31

## 2021-01-27 RX ADMIN — SODIUM CHLORIDE, POTASSIUM CHLORIDE, SODIUM LACTATE AND CALCIUM CHLORIDE: 600; 310; 30; 20 INJECTION, SOLUTION INTRAVENOUS at 06:43

## 2021-01-27 RX ADMIN — LIDOCAINE HYDROCHLORIDE 0.1 ML: 10 INJECTION, SOLUTION INFILTRATION; PERINEURAL at 06:44

## 2021-01-27 RX ADMIN — FENTANYL CITRATE 50 MCG: 50 INJECTION INTRAMUSCULAR; INTRAVENOUS at 07:26

## 2021-01-27 RX ADMIN — ACETAMINOPHEN 1000 MG: 500 TABLET, FILM COATED ORAL at 06:39

## 2021-01-27 RX ADMIN — LIDOCAINE HYDROCHLORIDE 80 MG: 20 INJECTION, SOLUTION INFILTRATION; PERINEURAL at 07:26

## 2021-01-27 RX ADMIN — OXYCODONE HYDROCHLORIDE AND ACETAMINOPHEN 2 TABLET: 5; 325 TABLET ORAL at 08:29

## 2021-01-27 ASSESSMENT — PULMONARY FUNCTION TESTS
PIF_VALUE: 2
PIF_VALUE: 5
PIF_VALUE: 2
PIF_VALUE: 1
PIF_VALUE: 2
PIF_VALUE: 5
PIF_VALUE: 3
PIF_VALUE: 4
PIF_VALUE: 11
PIF_VALUE: 2
PIF_VALUE: 2
PIF_VALUE: 0
PIF_VALUE: 2
PIF_VALUE: 0
PIF_VALUE: 2
PIF_VALUE: 5
PIF_VALUE: 2

## 2021-01-27 ASSESSMENT — PAIN SCALES - GENERAL
PAINLEVEL_OUTOF10: 3
PAINLEVEL_OUTOF10: 3

## 2021-01-27 ASSESSMENT — PAIN DESCRIPTION - PAIN TYPE: TYPE: SURGICAL PAIN

## 2021-01-27 ASSESSMENT — PAIN - FUNCTIONAL ASSESSMENT: PAIN_FUNCTIONAL_ASSESSMENT: 0-10

## 2021-01-27 ASSESSMENT — PAIN DESCRIPTION - DESCRIPTORS
DESCRIPTORS: ACHING
DESCRIPTORS: SHARP

## 2021-01-27 ASSESSMENT — PAIN DESCRIPTION - ORIENTATION: ORIENTATION: LEFT

## 2021-01-27 ASSESSMENT — LIFESTYLE VARIABLES: SMOKING_STATUS: 1

## 2021-01-27 NOTE — ANESTHESIA PRE PROCEDURE
Department of Anesthesiology  Preprocedure Note       Name:  Travon Beltran   Age:  50 y.o.  :  1965                                          MRN:  [id number]         Date:  2021      Surgeon: Tony Ramachandran):  Janell Levin DO    Procedure: Procedure(s):  LEFT KNEE VIDEO ARTHROSCOPY PLICA EXCISION, PARTIAL SYNOVECTOMY, MENISCAL AND CHONDRAL WORK AS NEEDED    Medications prior to admission:   Prior to Admission medications    Medication Sig Start Date End Date Taking?  Authorizing Provider   diclofenac (VOLTAREN) 50 MG EC tablet Take 1 tablet by mouth 2 times daily (with meals) 20  Yes LORNA Ba - CNP       Current medications:    Current Facility-Administered Medications   Medication Dose Route Frequency Provider Last Rate Last Admin    clindamycin (CLEOCIN) 900 mg in dextrose 5 % 50 mL IVPB  900 mg Intravenous Q8H Tanvi Beltrán DO        lidocaine 1 % injection 2 mL  2 mL Intradermal Once PRN Tomy Ward MD        lactated ringers infusion   Intravenous Continuous Tomy Ward MD        bupivacaine (PF) (MARCAINE) 0.25 % injection             lidocaine PF 1 % injection             meperidine (DEMEROL) injection SOLN 12.5 mg  12.5 mg Intravenous Q5 Min PRN Tomy Ward MD        HYDROmorphone (DILAUDID) injection 0.5 mg  0.5 mg Intravenous Q10 Min PRN Tomy Ward MD        HYDROmorphone (DILAUDID) injection 0.5 mg  0.5 mg Intravenous Q5 Min PRN Tomy Ward MD        oxyCODONE-acetaminophen (PERCOCET) 5-325 MG per tablet 1 tablet  1 tablet Oral PRN Tomy Ward MD        Or    oxyCODONE-acetaminophen (PERCOCET) 5-325 MG per tablet 2 tablet  2 tablet Oral PRN Tomy Ward MD        ondansetron [facility]) injection 4 mg  4 mg Intravenous Q30 Min PRN Tomy Ward MD        diphenhydrAMINE (BENADRYL) injection 6.25 mg  6.25 mg Intravenous Once PRN Tomy Ward MD        labetalol (NORMODYNE;TRANDATE) injection 5 mg  5 mg Intravenous Q15 Min PRN Jack Powell MD        hydrALAZINE (APRESOLINE) injection 5 mg  5 mg Intravenous Q30 Min PRN Jack Powell MD           Allergies:     Allergies   Allergen Reactions    Cephalexin     Keflex [Cephalexin]        Problem List:    Patient Active Problem List   Diagnosis Code    Impingement syndrome of right shoulder M75.41    AC (acromioclavicular) arthritis M19.019    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    Patellofemoral syndrome M22.2X9    Closed fracture of one or more phalanges of foot S92.919A    Calcific tendinitis of right shoulder M75.31    Sprain of carpometacarpal joint of left hand S63.8X2A    Right lateral epicondylitis B27.74    Plica syndrome, left knee M67.52       Past Medical History:        Diagnosis Date    PONV (postoperative nausea and vomiting)        Past Surgical History:        Procedure Laterality Date    ANTERIOR CRUCIATE LIGAMENT REPAIR      right     SHOULDER ARTHROSCOPY  4/26/12    right shoulder subacromial decompression, right shoulder distal clavicle resection       Social History:    Social History     Tobacco Use    Smoking status: Current Every Day Smoker     Packs/day: 0.50     Years: 20.00     Pack years: 10.00     Types: Cigarettes    Smokeless tobacco: Never Used   Substance Use Topics    Alcohol use: Yes     Comment: rarely                                Ready to quit: Not Answered  Counseling given: Not Answered      Vital Signs (Current):   Vitals:    01/27/21 0623   BP: 114/62   Pulse: 67   Resp: 17   Temp: 98 °F (36.7 °C)   TempSrc: Temporal   SpO2: 100%   Weight: 135 lb (61.2 kg)   Height: 5' 2\" (1.575 m)                                              BP Readings from Last 3 Encounters:   01/27/21 114/62   08/09/18 118/73   01/17/18 131/78       NPO Status: Time of last liquid consumption: 2100                        Time of last solid consumption: 2100                        Date of last liquid consumption: 01/26/21                        Date of last solid food consumption: 01/26/21    BMI:   Wt Readings from Last 3 Encounters:   01/27/21 135 lb (61.2 kg)   01/12/21 135 lb (61.2 kg)   01/07/21 135 lb (61.2 kg)     Body mass index is 24.69 kg/m². CBC: No results found for: WBC, RBC, HGB, HCT, MCV, RDW, PLT    CMP: No results found for: NA, K, CL, CO2, BUN, CREATININE, GFRAA, AGRATIO, LABGLOM, GLUCOSE, PROT, CALCIUM, BILITOT, ALKPHOS, AST, ALT    POC Tests: No results for input(s): POCGLU, POCNA, POCK, POCCL, POCBUN, POCHEMO, POCHCT in the last 72 hours. Coags: No results found for: PROTIME, INR, APTT    HCG (If Applicable): No results found for: PREGTESTUR, PREGSERUM, HCG, HCGQUANT     ABGs: No results found for: PHART, PO2ART, PMH7SBV, GJF5JHD, BEART, G9RKOFMH     Type & Screen (If Applicable):  No results found for: LABABO, LABRH    Drug/Infectious Status (If Applicable):  No results found for: HIV, HEPCAB    COVID-19 Screening (If Applicable):   Lab Results   Component Value Date    COVID19 Not Detected 01/25/2021         Anesthesia Evaluation  Patient summary reviewed and Nursing notes reviewed   history of anesthetic complications: PONV. Airway: Mallampati: II     Neck ROM: full   Dental:          Pulmonary:   (+) current smoker                           Cardiovascular:                      Neuro/Psych:               GI/Hepatic/Renal:             Endo/Other:                     Abdominal:           Vascular:                                        Anesthesia Plan      general     ASA 2             Anesthetic plan and risks discussed with patient. Plan discussed with CRNA.                   Mahnaz Lugo MD   1/27/2021

## 2021-01-27 NOTE — OP NOTE
José Miguel Gore (1965)    1/27/2021 Date of surgery    1.  left knee medial plica syndrome                Postoperative Diagnosis- 1.  left knee medial plica syndrome, synovitis      2. Outerbridge Grade 2 changes medial femoral condyle              Procedure-  1.  medial plica excision with synovectomy left knee (CPT 68031 )     2. Diagnostic arthroscopy  left knee (CPT 22348)      Surgeon-  Brooklyn Smith DO    Assistant(s)-  Scrub Person First: Efrain Jazz     Anesthesia- General    Tourniquet Time: see OR chart    EBL: minimal    Indications for Operation  The patient was evaluated in the office with history and symptoms consistent with the above diagnosis. Appropriate diagnostic testing was performed confirming the recommendation to proceed with surgical intervention. Options of treatment, both non-operative and operative were discussed. Today the surgical procedure was again discussed in detail. The risks and possible complications of the surgery in general, as well as those directly related to this procedure were reviewed today. No guarantees were implied or stated. General risks include but are not limited to persistent pain, infection, excessive blood loss, neurovascular injury, chronic swelling or edema, and the potential need for additional treatment or surgical intervention in the future. The patient understands that, again, the risks and possible complications are not limited to those specifically stated above. In addition today, we discussed the preoperative and postoperative protocol, the often lengthy recovery, and the expectation that the patient will be compliant with instructions and perform the appropriate rehab program as prescribed. The patient accepted the above risks, possible complications, and protocol and elects to proceed.  All questions were answered appropriately, and they understand that they can contact the office at any time to further discuss any questions or concerns. Site Marking and Surgical Prep  The patient was seen in the holding area and the  left knee was marked with a pen. The patient was taken to the operative suite, identified, placed on the operating room table in the supine position. After induction of general anesthesia, the extremity was elevated, prepped with Duraprep and draped in the normal, sterile fashion. Tourniquet was inflated to 300 mmhg for the duration of the case. Examination  Under Anesthesia  There was full range of motion, no cruciate or collateral ligament insufficiency. Diagnostic Arthroscopy  A standard inferolateral portal was established. The trocar and cannula were inserted. The trocar was removed and the arthroscope and inflow inserted. The inferomedial portal was established under direct vision after localizing the joint with a spinal needle. A nerve hook was inserted and all relevant structures probed. Systematic arthroscopy was performed and the findings are summarized below:  1. Patellofemoral Compartment- The articular cartilage was intact. Prominent Medial plica was evident and causing mechanical irritation, synovitis of fat pad also present. There were no loose bodies in the suprapatellar pouch  2. Medial Compartment- The medial meniscus was intact , Outerbridge Grade 1-2 softening changes medial femoral condyle   3. Intercondylar Notch- The ACL and PCL were intact  4. Lateral Compartment- The lateral meniscus was intact , Outerbridge Grade 1 changes lateral femoral condyle and lateral tibia plateau     Plica/synovium Work  5. Plica excison  a. Medial plica was excised using a combination of shaver and radiofrequency ablator. The plica was carefully debrided to a stable rim. 6. Synovectomy  a. Using a shaver, the hypertrophic fat pad was carefully debrided to a smooth surface with stable borders. Closure  The portal sites were closed with 4-0 Nylon. Marcaine (0.5%) was injected into the joint.   Sterile dressings and an elastic bandage were placed. The patient was awakened and taken to the postoperative area in stable condition. The toes were pink and warm. All sponge and needle counts were correct. Postop Instructions      The patient was instructed to minimize activity and ice their surgical extremity frequently for the first 24-72 hours. They should use 1 or 2 crutches as needed to bear as much weight as possible on the operated leg. Prior to discharge crutch training, a prescription for a narcotic and follow up appointment was provided.      Vianey Simpson

## 2021-01-27 NOTE — ANESTHESIA POSTPROCEDURE EVALUATION
Department of Anesthesiology  Postprocedure Note    Patient: Arlene Castellon  MRN: [id number]  YOB: 1965  Date of evaluation: 1/27/2021  Time:  10:22 AM     Procedure Summary     Date: 01/27/21 Room / Location: [facility] EG OR 02 / [facility]    Anesthesia Start: Kitty Gómez Anesthesia Stop: 0805    Procedure: LEFT KNEE VIDEO ARTHROSCOPY MEDIAL PLICA EXCISION,  SYNOVECTOMY, (Left Knee) Diagnosis: (PLICA SYNDROME LEFT KNEE)    Surgeons: Clive Young DO Responsible Provider: Kandis Bishop MD    Anesthesia Type: general ASA Status: 2          Anesthesia Type: general    Ellen Phase I: Ellen Score: 10    Ellen Phase II: Ellen Score: 10    Last vitals: Reviewed and per EMR flowsheets. Anesthesia Post Evaluation    Comments: Postoperative Anesthesia Note    Name:    Arlene Castellon  MRN:      [id number]    Patient Vitals in the past 12 hrs:  01/27/21 0842, BP:132/78, Pulse:72, Resp:16, SpO2:97 %  01/27/21 0829, BP:136/83, Pulse:75, Resp:17, SpO2:97 %  01/27/21 0820, BP:(!) 154/82, Temp:98 °F (36.7 °C), Temp src:Temporal, Pulse:75, Resp:16, SpO2:97 %  01/27/21 0815, BP:133/74, Pulse:73, Resp:17, SpO2:97 %  01/27/21 0810, BP:135/75, Pulse:57, Resp:14, SpO2:95 %  01/27/21 0806, BP:131/71, Temp:98 °F (36.7 °C), Temp src:Temporal, Pulse:54, Resp:15, SpO2:97 %  01/27/21 0623, BP:114/62, Temp:98 °F (36.7 °C), Temp src:Temporal, Pulse:67, Resp:17, SpO2:100 %, Height:5' 2\" (1.575 m), Weight:135 lb (61.2 kg)     LABS:    CBC  No results found for: WBC, HGB, HCT, PLT  RENAL  No results found for: NA, K, CL, CO2, BUN, CREATININE, GLUCOSE  COAGS  No results found for: PROTIME, INR, APTT    Intake & Output: In: 400 (I.V.:400)  Out: 5     Nausea & Vomiting:  No    Level of Consciousness:  Awake    Pain Assessment:  Adequate analgesia    Anesthesia Complications:  No apparent anesthetic complications    SUMMARY      Vital signs stable  OK to discharge from Stage I post anesthesia care.   Care transferred from Anesthesiology department on discharge from perioperative area

## 2021-01-28 ENCOUNTER — OFFICE VISIT (OUTPATIENT)
Dept: ORTHOPEDIC SURGERY | Age: 56
End: 2021-01-28

## 2021-01-28 VITALS — BODY MASS INDEX: 24.84 KG/M2 | WEIGHT: 135 LBS | HEIGHT: 62 IN

## 2021-01-28 DIAGNOSIS — Z98.890 S/P LEFT KNEE ARTHROSCOPY: ICD-10-CM

## 2021-01-28 DIAGNOSIS — M67.52 PLICA SYNDROME, LEFT KNEE: Primary | ICD-10-CM

## 2021-01-28 PROCEDURE — 99024 POSTOP FOLLOW-UP VISIT: CPT | Performed by: ORTHOPAEDIC SURGERY

## 2021-01-28 NOTE — PROGRESS NOTES
Chief Complaint  Post-Op Check (LEFT KNEE VIDEO ARTHROSCOPY MEDIAL PLICA EXCISION, SYNOVECTOMY, SX:1/27/21)    Post op left knee arthroscopy medial plica excision and partial synovectomy  DOS: January 27, 2021       History of Present Illness:  Guerrero Lawson is a 50 y.o. female  Here for first follow up of left knee arthroscopy medial plica excision and partial synovectomy. Progressing well. Has not started outpatient physical therapy. The patient's pain is rated at 4/10. The patient denies fever, wound drainage, increasing redness, pus, increasing swelling. Taking oral pain medication as needed. Using local cold therapy as needed. PHYSICAL EXAMINATION:    Ht 5' 2.01\" (1.575 m)   Wt 135 lb (61.2 kg)   BMI 24.69 kg/m²     Pain Assessment:       Knee Examination:  Patient is awake, alert, and in no acute distress. Dressing removed today. Portals are healing well. Skin is intact. Minimal ecchymosis. Sensation is intact to light touch throughout lower extremity. The wound is clean, dry, no drainage. There is no warmth, erythema, or purulent drainage over the incision. Patient tolerated gentle passive range of motion. ROM has been progressing. Intra-operative findings were discussed. None  Diagnostic Test Findings: No new xrays taken today      Assessment: Progressing well post op from left knee arthroscopy with medial plica excision and partial synovectomy. Impression:  Encounter Diagnoses   Name Primary?  Plica syndrome, left knee Yes    S/P left knee arthroscopy          Treatment Plan:  Sutures will be removed and steri-strips applied by PT next week. Specific precautions were reviewed and emphasized. Patient will start outpatient physical therapy. Follow up appointment was arranged at patient's convenience in 4 weeks. Bob Gomez      This dictation was performed with a verbal recognition program (DRAGON) and it was checked for errors.  It is possible that there are still dictated errors within this office note. If so, please bring any errors to my attention for an addendum. All efforts were made to ensure that this office note is accurate.

## 2021-02-02 ENCOUNTER — HOSPITAL ENCOUNTER (OUTPATIENT)
Dept: PHYSICAL THERAPY | Age: 56
Setting detail: THERAPIES SERIES
Discharge: HOME | End: 2021-02-02
Payer: COMMERCIAL

## 2021-02-02 PROCEDURE — 97110 THERAPEUTIC EXERCISES: CPT

## 2021-02-02 PROCEDURE — 97161 PT EVAL LOW COMPLEX 20 MIN: CPT

## 2021-02-02 PROCEDURE — 97016 VASOPNEUMATIC DEVICE THERAPY: CPT

## 2021-02-02 NOTE — FLOWSHEET NOTE
Therapeutic Exercise/Home Exercise Program:   x30 min. Patient educated on eval findings, plan of care, and goals. Instructed in HEP with handout given. Group Therapy:    0 minutes    Therapeutic Activity:  0 minutes     Gait: 0 minutes    Neuromuscular Re-Education:  0 minutes      Canalith Repositioning Procedure:  0 minutes    Manual Therapy:  0 minutes    Modalities: 15 minutes   [x] GAME READY (VASO)- for significant edema, swelling, pain control, to L knee with LE elevated, 34 deg, min pressure     Functional Outcome Measure:   []NA  Measure Used: LEFS  Date Assessed: 2/2/21  Score: 15/80=81%    Assessment/Treatment/Activity Tolerance:    Patients response to treatment:   [x]Patient tolerated treatment well with no adverse reactions noted    []Patient limited by fatigue   []Patient limited by pain    []Patient limited by other medical complications   [x]Other:  No change in pain noted after treatment    Goals:   Progress towards goals:  Goals established on 2/2/21   GOALS:  Short Term Goals:  2 weeks  1. Independent in HEP and progression per patient tolerance, in order to prevent re-injury. []? Progressing: []? Met: []? Not Met: []? Adjusted            2. Patient will have a decrease in pain to facilitate improvement in movement, function, and ADLs as indicated by Functional Deficits. []? Progressing: []? Met: []? Not Met: []? Adjusted               Long Term Goals:  8 weeks  1. Disability index score of 20% or less for the LEFS functional questionnaire to assist with reaching prior level of function. []? Progressing: []? Met: []? Not Met: []? Adjusted           2. Patient will demonstrate increased AROM L knee =R knee to allow for proper joint functioning as indicated by patients Functional Deficits. []? Progressing: []? Met: []? Not Met: []? Adjusted            3.  Patient will demonstrate an increase in strength to 4+/5 or greater in L LE to allow for proper functional mobility as indicated by patients Functional Deficits. []? Progressing: []? Met: []? Not Met: []? Adjusted            4. Patient will return to all transfers, work activities, and functional activities without increased symptoms or restriction. []? Progressing: []? Met: []? Not Met: []? Adjusted            5. Patient will have 0-2/10 pain with ADL's.  []? Progressing: []? Met: []? Not Met: []? Adjusted            6. Patient stated goal: Sleep through the night without waking due to pain  []? Progressing: []? Met: []? Not Met: []? Adjusted     Prognosis: [x]Good   []Fair   []Poor    Patient Requires Follow-up:  [x]Yes  []No    Plan: [x]Plan of care initiated     []Continue per plan of care    [] Alter current plan (see comments)    []Hold pending MD visit []Discharge    Charges:  Timed Code Treatment Minutes: 30   Total Treatment Minutes:  60   BWC time for each procedure?:  TE TIME:  NMR TIME:  MANUAL TIME:  UNTIMED MINUTES:       [x] EVAL (LOW) 27871 (typically 20 minutes face-to-face)  [] EVAL (MOD) 20602 (typically 30 minutes face-to-face)  [] EVAL (HIGH) 86630 (typically 45 minutes face-to-face)  [] RE-EVAL     [x] ZT(13380) x2     [] IONTO  [] NMR (59733) x     [x] VASO  [] Manual (83734) x     [] Other:  [] TA x      [] Mech Traction (78965)  [] ES(attended) (89535)     [] ES (un) (70989):      Electronically signed by:  Fozia Kaufman, PT, MPT [id number]    Note: If patient does not return for scheduled/ recommended follow up visits, this note will serve as a discharge from care along with most recent update on progress.

## 2021-02-02 NOTE — PROGRESS NOTES
[facility], [facility], [address]  Phone: [phone]                                                    Physical Therapy Certification    We had the pleasure of evaluating the following patient for physical therapy services at [address] [place] [address]. A summary of our findings can be found in the initial assessment below. This includes our plan of care. If you have any questions or concerns regarding these findings, please do not hesitate to contact me at the office phone number checked above. Thank you for the referral.       Physician Signature:_______________________________Date:__________________  By signing above (or electronic signature), therapists plan is approved by physician    [id number] [facility] EVALUATION    Patient: Timbo Freitas   : 1965   MRN: [id number]     Medical Diagnosis: Plica syndrome, L Knee (M67.52), s/p L knee arthroscopy (F95.951)  Treatment Diagnosis:   Limited ROM, weakness, gait and balance deviations, swelling     Onset Date: 21  Referral Date: 21  Referring Physician:  Sharri Michelle DO     Visits Allowed/Insurance/Certification Information:  BCBS - 30 visits      Restrictions/Precautions:  WBAT    C-SSRS Triggered by Intake questionnaire (Past 2 wk assessment):   [x] No, Questionnaire did not trigger screening.   [] Yes, Patient intake triggered further evaluation      [] C-SSRS Screening completed  [] PCP notified via Plan of Care  [] Emergency services notified     Pt's Occupation/Job Duties:  Works full-time at [employer] as a . 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       Social support/Environment:    Family/caregiver support:   [x]Yes, lives    []No    Home Environment:   []1 story   [x]>2 story   [x] KIMBERLY-2   [x]No rail   []Handrail   Bedroom on 2nd floor, has bathroom on both floors, has been sleeping on first floor    Health History reviewed with pt:   [x]Yes   []No      PMH:  R ACL repair, R shoulder subacromial decompression with distal clavicle resection, smokes    SUBJECTIVE FINDINGS         History of Present Illness:         Pt reports she was in her horse barn on January 4, 2021, was squatted down and felt a snap in her L knee. She had instant pain in her L knee, and followed up with ortho on 1/7/21. She had imaging done as follows:    X-RAYS: 4 views weightbearing AP, lateral. PA and sunrise of the left knee were reviewed, they show no periosteal reaction, medullary lesions, or osteopenia. Joint spaces are well maintained. No evidence of fracture or dislocation.   MRI images of the left knee were reviewed and show:  Hypertrophic medial plica and synovitis  Medial lateral meniscus are intact, no evidence of cruciate or collateral ligament tear  MCL sprain, grade 1  She had surgery done on 1/27/21, LEFT KNEE VIDEO ARTHROSCOPY MEDIAL PLICA EXCISION,  SYNOVECTOMY, and now referred for PT treatment. Pain       Patient describes pain to be fairly constant in L knee peripatellar region, posterior capsule  Patient reports pain is  2/10 pain at present and  7/10 pain at its worst.  Worsened by: Walking >5 min, standing >5, squatting, stairs, end range motion    Improved by: rest, ice, elevation    Pt. reports knee/hip/ankle crepitus, locking, buckling:   []Yes   [x]No   []NA     Current Functional Limitations:   [x]Yes   []No  Functional complaints:  Standing, walking, squatting, stairs, bending    PLOF:   [x]No functional limitations   []Pt with previous functional limitations   Pt's sleep is affected?    [x]Yes, not sleeping well due to pain   []No     Patient goal for therapy:  \"get back to normal\"        OBJECTIVE FINDINGS         Gait/Steps/Balance       []Gait WNL unless otherwise noted below:                             [x]Deviations on a level linoleum surface include:  Antalgic on L, no AD, decreased L knee flexion, decreased letitia    []Steps WNL (reciprocal pattern with 0-1 rail) unless otherwise noted below:    [x]Deviations include:  Non-alternating with B rails    [x]All balance WNL unless otherwise noted below:      Static/ Dynamic Sitting:    Static/Dynamic Standing:  Unable to SLS on L    Posture: [] WNL  []Forward head   []Forward flexed trunk    []Pronounced CT junction    []Increased thoracic kyphosis   []Increased lumbar lordosis   []Scoliosis   []Swayback   [x]Decreased WB on   []R   [x]L     []Other:       Sensation   [x]All dermatomes WNL for light touch    Range of Motion/Strength Testing-Myotomes    [x]All ROM WNL except as marked below    [x]All strength WNL (5/5) except as marked below (measured out of 5)   [x]All  myotomes WNL (5/5) except as marked below (measured out of 5)      Range Tested AROM PROM MMT/Resisted Comments   *denotes pain Left Right Left Right Left Right    Hip Flexion  (L1-2)     3+/5 4/5    Hip Extension     4-/5 4/5    Hip Abduction  (L5)     4-/5 4/5    Hip Adduction  (L3)     3/5 4/5    Hip IR          Hip ER          Knee Flexion  (L5,S1) 101 150   4-/5 4+/5    Knee Extension  (L3,4) 4 0   4/5 5/5    Ankle Dorsiflex  (L4)     5/5 5/5    Ankle Plantarflex  (S1,2)     4/5 5/5    Ankle Inversion     5/5 5/5    Ankle Eversion     5/5 5/5    Great Toe Ext  (L5)              Flexibility     Muscle Findings   Hip flexors/Norberto  []WNL   []Decreased R   []Decreased L   []NT   Hamstrings  Degrees in 90/90 [x]WNL   []Decreased R   []Decreased L   []NT  Right:  -15            Left:  -20    Gastrocs []WNL   []Decreased R   []Decreased L   []NT   Obers/TFL/ITB []WNL   []Decreased R   []Decreased L   []NT   Piriformis  []WNL   []Decreased R   []Decreased L   []NT   Other:  []WNL   []Decreased R   []Decreased L   []NT     Special Tests- knee and ankle  Homans- negative    Palpation     Patient reported tenderness with palpation:  [x]Yes   []No   []NT  Location:  L knee medial joint line, peripatellar region  PT notes warmth:  [x]Yes   []No   []NT  Location: slightly warm to touch as expected after surgery  PT notes increased muscle tone:   []Yes   [x]No   []NT  Location:   PT notes crepitus with palpation:   []Yes   [x]No   []NT   Location:     Appearance  Two portals covered with bandaids. PT notes swelling:   [x]Yes   []No   []NT  Location:  L knee   PT notes redness:  []Yes   [x]No   []NT  Location:   PT notes drainage:   []Yes   [x]No   []NT  Location:      Girth Measurements (cm)        Location Left Right Location Left Right   2\" above mid patella 42.0 41.0 3\" inferior to inferior patellar pole     Mid patella 38.4 37.0 6\" inferior to inferior patellar pole     2\" below mid patella 34.0 34.0 Malleoli     4\" above mid patella 44.5 46.3 Figure 8        Met heads        Co-morbidities/Complexities (which will affect course of rehabilitation):  [x]None           Arthritic conditions   []Rheumatoid arthritis (M05.9)  []Osteoarthritis (M19.91)   Cardiovascular conditions   []Hypertension (I10)  []Hyperlipidemia (E78.5)  []Angina pectoris (I20)  []Atherosclerosis (I70)   Musculoskeletal conditions   []Disc pathology   []Congenital spine pathologies   []Prior surgical intervention  []Osteoporosis (M81.8)  []Osteopenia (M85.8)   Endocrine conditions   []Hypothyroid (E03.9)  []Hyperthyroid Gastrointestinal conditions   []Constipation (N71.39)   Metabolic conditions   []Morbid obesity (E66.01)  []Diabetes type 1(E10.65) or 2 (E11.65)   []Neuropathy (G60.9)     Pulmonary conditions   []Asthma (J45)  []Coughing   []COPD (J44.9)   Psychological Disorders  []Anxiety (F41.9)  []Depression (F32.9)   []Other:   []Other:          Functional Outcome Measure:   []NA  Measure Used: LEFS  Date Assessed: 2/2/21  Score: 15/80=81%    ASSESSMENT: Patient presents with s/s consistent with Dx. Recommend PT treatment to address problem list so that the patient may return to regular activities without any functional limitations noted. Functional Impairments:     []Noted lumbar/proximal hip/LE joint hypomobility   [x]Decreased LE functional ROM   [x]Decreased core/proximal hip strength and neuromuscular control   [x]Decreased LE functional strength   [x]Reduced balance/proprioceptive control   [x]other:  Gait deviations    Functional Activity Limitations (from functional questionnaire and intake)   [x]Reduced ability to tolerate prolonged functional positions   [x]Reduced ability or difficulty with changes of positions or transfers between positions   [x]Reduced ability to maintain good posture and demonstrate good body mechanics with sitting, bending, and lifting   [x]Reduced ability to sleep   [] Reduced ability or tolerance with driving and/or computer work   [x]Reduced ability to perform lifting, carrying tasks   [x]Reduced ability to squat   []Reduced ability to forward bend   [x]Reduced ability to ambulate prolonged functional periods/distances/surfaces   [x]Reduced ability to ascend/descend stairs   [x]Reduced ability to run, hop, cut or jump   []other:    Participation Restrictions   []Reduced participation in self care activities   [x]Reduced participation in home management activities   [x]Reduced participation in work activities   [x]Reduced participation in social activities. []Reduced participation in sport/recreation activities. Classification :    [x]Signs/symptoms consistent with post-surgical status including decreased ROM, strength and function.    []Signs/symptoms consistent with joint sprain/strain   []Signs/symptoms consistent with patella-femoral syndrome   []Signs/symptoms consistent with knee OA/hip OA   []Signs/symptoms consistent with internal derangement of knee/Hip   []Signs/symptoms consistent with functional hip weakness/NMR control      []Signs/symptoms consistent with tendinitis/tendinosis    []signs/symptoms consistent with pathology which may benefit from Dry needling      []other:      Rehabilitation Potential:  Good for goals listed below. Strengths for achieving goals include:   [x]Pt motivated   [x]PLOF   [x]Family support   Limitations for achieving goals include: []Pain  []Severity of condition     []Cognitive   []Lack of family support   []Lack of resources     []Other:         Prognosis:   [x]Good   []Fair   []Poor    GOALS:  Short Term Goals:  2 weeks  1. Independent in HEP and progression per patient tolerance, in order to prevent re-injury. [] Progressing: [] Met: [] Not Met: [] Adjusted   2. Patient will have a decrease in pain to facilitate improvement in movement, function, and ADLs as indicated by Functional Deficits. [] Progressing: [] Met: [] Not Met: [] Adjusted     Long Term Goals:  8 weeks  1. Disability index score of 20% or less for the LEFS functional questionnaire to assist with reaching prior level of function. [] Progressing: [] Met: [] Not Met: [] Adjusted   2. Patient will demonstrate increased AROM L knee =R knee to allow for proper joint functioning as indicated by patients Functional Deficits. [] Progressing: [] Met: [] Not Met: [] Adjusted   3. Patient will demonstrate an increase in strength to 4+/5 or greater in L LE to allow for proper functional mobility as indicated by patients Functional Deficits. [] Progressing: [] Met: [] Not Met: [] Adjusted   4. Patient will return to all transfers, work activities, and functional activities without increased symptoms or restriction. [] Progressing: [] Met: [] Not Met: [] Adjusted   5. Patient will have 0-2/10 pain with ADL's.  [] Progressing: [] Met: [] Not Met: [] Adjusted   6.  Patient stated goal: Sleep through the night without waking due to pain  [] Progressing: [] Met: [] Not Met: [] Adjusted     PLAN OF CARE     To see patient  1-2 x/week for 8  weeks for the following treatment interventions:  [x]Therapeutic Exercise  [x]Modalities of Choice:   []Heat   []Cold   []US   []Estim   []Ionto  []Mechanical Traction [x]Manual Therapy  [x]Neuromuscular Re-Education  [x]Gait Training   [x]Therapeutic Activity  []Other     Physical Therapy Evaluation Complexity Justification  [x] EVAL (LOW) 42063 (typically 20 minutes face-to-face)  [] EVAL (MOD) 16130 (typically 30 minutes face-to-face)  [] EVAL (HIGH) 54286 (typically 45 minutes face-to-face)  [] RE-EVAL     Thank you for the referral of this patient.       Timed Code Treatment Minutes:  30   minutes   Total Treatment Time: 60   minutes    Leno Mcconnell PT, MPT [id number]

## 2021-02-08 ENCOUNTER — HOSPITAL ENCOUNTER (OUTPATIENT)
Dept: PHYSICAL THERAPY | Age: 56
Setting detail: THERAPIES SERIES
Discharge: HOME | End: 2021-02-08
Payer: COMMERCIAL

## 2021-02-08 PROCEDURE — 97016 VASOPNEUMATIC DEVICE THERAPY: CPT

## 2021-02-08 PROCEDURE — 97110 THERAPEUTIC EXERCISES: CPT

## 2021-02-08 NOTE — FLOWSHEET NOTE
AP/QS/GS/HS  x10 each, reviewed x    SLR 4 way 2x10 ea x    Supine hip abd x10 x    heel/toe raises  2x10 x    standing marches   2x10 x    standing hip abd  2x10 B, UE support for std L x    standing hip ext 2x10 B, UE support for std L x                                                                              Therapeutic Exercise/Home Exercise Program:   x28 min. May remove stitches on Thursday     Instructed in HEP with handout given. Group Therapy:    0 minutes    Therapeutic Activity:  0 minutes     Gait: 0 minutes    Neuromuscular Re-Education:  0 minutes      Canalith Repositioning Procedure:  0 minutes    Manual Therapy:  0 minutes    Modalities: 15 minutes   [x] GAME READY (VASO)- for significant edema, swelling, pain control, to L knee with LE elevated, 34 deg, min pressure     Functional Outcome Measure:   []NA  Measure Used: LEFS  Date Assessed: 2/2/21  Score: 15/80=81%    Assessment/Treatment/Activity Tolerance:    Patients response to treatment:   [x]Patient tolerated treatment well with no adverse reactions noted    []Patient limited by fatigue   []Patient limited by pain    []Patient limited by other medical complications   []Other:    Goals:   Progress towards goals:  Goals established on 2/2/21   GOALS:  Short Term Goals:  2 weeks  1. Independent in HEP and progression per patient tolerance, in order to prevent re-injury. []? Progressing: []? Met: []? Not Met: []? Adjusted            2. Patient will have a decrease in pain to facilitate improvement in movement, function, and ADLs as indicated by Functional Deficits. []? Progressing: []? Met: []? Not Met: []? Adjusted               Long Term Goals:  8 weeks  1. Disability index score of 20% or less for the LEFS functional questionnaire to assist with reaching prior level of function. []? Progressing: []? Met: []? Not Met: []? Adjusted           2.  Patient will demonstrate increased AROM L knee =R knee to allow for proper joint

## 2021-02-11 ENCOUNTER — HOSPITAL ENCOUNTER (OUTPATIENT)
Dept: PHYSICAL THERAPY | Age: 56
Setting detail: THERAPIES SERIES
Discharge: HOME | End: 2021-02-11
Payer: COMMERCIAL

## 2021-02-11 PROCEDURE — 97110 THERAPEUTIC EXERCISES: CPT

## 2021-02-11 PROCEDURE — 97016 VASOPNEUMATIC DEVICE THERAPY: CPT

## 2021-02-11 PROCEDURE — 97140 MANUAL THERAPY 1/> REGIONS: CPT

## 2021-02-11 NOTE — FLOWSHEET NOTE
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), s/p L knee arthroscopy (Z98.890)  Treatment Diagnosis:   Limited ROM, weakness, gait and balance deviations, swelling     Onset Date: 1/27/21  Referral Date: 1/28/21  Referring Physician:  Maci Mason,      Visits Allowed/Insurance/Certification Information:  Christina Cannon 150 - 30 visits      Restrictions/Precautions:  WBAT    Plan of care sent to provider:   [x]NA   []Faxed   [x]Co-signature       Plan of care signed:    [x]NA   []Yes   []No      Progress report will be due (10 Rx or 30 days whichever is less):  9/4/10    Recertification will be due (POC Duration  / 90 days whichever is less): 5/2/21    Visit# / total visits:     Visit # Insurance Allowable Auth Required   In Person 3/16 30 []  Yes     [x]  No    Tele Health 0  []  Yes     []  No    Total 3/16       Plan for Next Session:  Add SLS, gait training      Subjective:   Reports Tuesday was very bad but doing some better today-feels we over did the ex last visit     Pain level: 2-7/10    AT EVAL: Patient describes pain to be fairly constant in L knee peripatellar region, posterior capsule  Patient reports pain is  2/10 pain at present and  7/10 pain at its worst.  Worsened by: Walking >5 min, standing >5, squatting, stairs, end range motion        Objective:     Supine flex after nustep 114    Exercises:    Exercises in bold performed in department today. Items not bolded are carried forward from prior visits for continuity of the record.   Exercise/Equipment Resistance/Repetitions Issued for HEP     nustep S8A9 L3 x 5' Using arms due to pain and decr load    AP/QS/GS/HS x10 each, reviewed x    SLR 4 way 2x10 ea x    Supine hip abd x10 x    heel/toe raises  2x10 x    standing marches   2x10 x      Held today   \"                                                                           Therapeutic Exercise/Home Exercise Program:   x17 min. Instructed in HEP with handout given. Group Therapy:    0 minutes    Therapeutic Activity:  0 minutes     Gait: 0 minutes    Neuromuscular Re-Education:  0 minutes      Canalith Repositioning Procedure:  0 minutes    Manual Therapy: 10 minutes  Stitch removal, cleaned incisions with alcohol prep pad and dressed with self adhesive bandage  Incisions heeling well with no seepages  Gentle patella mobility and STM     Modalities: 15 minutes   [x] GAME READY (VASO)- for significant edema, swelling, pain control, to L knee with LE elevated, 34 deg, min pressure     Functional Outcome Measure:   []NA  Measure Used: LEFS  Date Assessed: 2/2/21  Score: 15/80=81%    Assessment/Treatment/Activity Tolerance:  Modified ex due to pain inc in knee along with edema (per pt reports)  Patients response to treatment:   []Patient tolerated treatment well with no adverse reactions noted    []Patient limited by fatigue   [x]Patient limited by pain    []Patient limited by other medical complications   []Other:    Goals:   Progress towards goals:  Goals established on 2/2/21   GOALS:  Short Term Goals:  2 weeks  1. Independent in HEP and progression per patient tolerance, in order to prevent re-injury. []? Progressing: []? Met: []? Not Met: []? Adjusted            2. Patient will have a decrease in pain to facilitate improvement in movement, function, and ADLs as indicated by Functional Deficits. []? Progressing: []? Met: []? Not Met: []? Adjusted               Long Term Goals:  8 weeks  1. Disability index score of 20% or less for the LEFS functional questionnaire to assist with reaching prior level of function. []? Progressing: []? Met: []?  Not Met: []? Adjusted           2. Patient will demonstrate increased AROM L knee =R knee to allow for proper joint functioning as indicated by patients Functional Deficits. []? Progressing: []? Met: []? Not Met: []? Adjusted            3. Patient will demonstrate an increase in strength to 4+/5 or greater in L LE to allow for proper functional mobility as indicated by patients Functional Deficits. []? Progressing: []? Met: []? Not Met: []? Adjusted            4. Patient will return to all transfers, work activities, and functional activities without increased symptoms or restriction. []? Progressing: []? Met: []? Not Met: []? Adjusted            5. Patient will have 0-2/10 pain with ADL's.  []? Progressing: []? Met: []? Not Met: []? Adjusted            6. Patient stated goal: Sleep through the night without waking due to pain  []? Progressing: []? Met: []? Not Met: []? Adjusted     Prognosis: [x]Good   []Fair   []Poor    Patient Requires Follow-up:  [x]Yes  []No    Plan: []Plan of care initiated     [x]Continue per plan of care    [] Alter current plan (see comments)    []Hold pending MD visit []Discharge    Charges:  Timed Code Treatment Minutes: 27   Total Treatment Minutes:  42   [facility] time for each procedure?:  TE TIME:  NMR TIME:  MANUAL TIME:  UNTIMED MINUTES:       [] EVAL (LOW) 86337 (typically 20 minutes face-to-face)  [] EVAL (MOD) 73259 (typically 30 minutes face-to-face)  [] EVAL (HIGH) 14694 (typically 45 minutes face-to-face)  [] RE-EVAL     [x] KM(51713) x1     [] IONTO  [] NMR (61821) x     [x] VASO  [x] Manual (94508) x1     [] Other:  [] TA x      [] Mech Traction (50848)  [] ES(attended) (66639)     [] ES (un) (06417):      Electronically signed by:  Fabiola Weinberg, [id number]    Note: If patient does not return for scheduled/ recommended follow up visits, this note will serve as a discharge from care along with most recent update on progress.

## 2021-02-12 DIAGNOSIS — Z98.890 S/P LEFT KNEE ARTHROSCOPY: Primary | ICD-10-CM

## 2021-02-12 RX ORDER — HYDROCODONE BITARTRATE AND ACETAMINOPHEN 5; 325 MG/1; MG/1
1 TABLET ORAL EVERY 6 HOURS PRN
Qty: 28 TABLET | Refills: 0 | Status: SHIPPED | OUTPATIENT
Start: 2021-02-12 | End: 2021-02-19

## 2021-02-15 ENCOUNTER — HOSPITAL ENCOUNTER (OUTPATIENT)
Dept: PHYSICAL THERAPY | Age: 56
Discharge: HOME | End: 2021-02-15

## 2021-02-15 NOTE — FLOWSHEET NOTE
Physical Therapy  Cancellation/No-show Note  Patient Name:  Rozina Burch  :  1965   Date:  2/15/2021  Cancels to Date: 1  No-shows to Date: 0    For today's appointment patient:  [x]  Cancelled  []  Rescheduled appointment  []  No-show     Reason given by patient:  []  Patient ill  []  Conflicting appointment  []  No transportation    []  Conflict with work  []  No reason given  []  Other:     Comments:  snow  Electronically signed byShelbie Wheatley, [id number]

## 2021-02-18 ENCOUNTER — HOSPITAL ENCOUNTER (OUTPATIENT)
Dept: PHYSICAL THERAPY | Age: 56
Setting detail: THERAPIES SERIES
Discharge: HOME | End: 2021-02-18
Payer: COMMERCIAL

## 2021-02-18 PROCEDURE — 97140 MANUAL THERAPY 1/> REGIONS: CPT

## 2021-02-18 PROCEDURE — 97110 THERAPEUTIC EXERCISES: CPT

## 2021-02-18 NOTE — PROGRESS NOTES
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), s/p L knee arthroscopy (Z98.890)  Treatment Diagnosis:   Limited ROM, weakness, gait and balance deviations, swelling     Onset Date: 1/27/21  Referral Date: 1/28/21  Referring Physician:  Anette Thapa,      Visits Allowed/Insurance/Certification Information:  Christina Cannon 150 - 30 visits      Restrictions/Precautions:  WBAT    Plan of care sent to provider:   [x]NA   []Faxed   [x]Co-signature       Plan of care signed:    [x]NA   []Yes   []No      Progress report will be due (10 Rx or 30 days whichever is less):  9/7/16    Recertification will be due (POC Duration  / 90 days whichever is less): 5/2/21    Visit# / total visits:     Visit # Insurance Allowable Auth Required   In Person 4/16 30 []  Yes     [x]  No    [facility] 0  []  Yes     []  No    Total 4/16       Plan for Next Session:  Add SLS, gait training      Subjective:   Reports reports she has some sharp pains medial mid knee. Reports her knee still swells with prolonged sitting at her desk even though she tries to move it      Pain level: 1-6/10  In the last wk  AT [facility]: Patient describes pain to be fairly constant in L knee peripatellar region, posterior capsule  Patient reports pain is  2/10 pain at present and  7/10 pain at its worst.  Worsened by: Walking >5 min, standing >5, squatting, stairs, end range motion        Objective:     Supine flex after nustep 0-120    Exercises:    Exercises in bold performed in department today. Items not bolded are carried forward from prior visits for continuity of the record.   Exercise/Equipment Resistance/Repetitions Issued for HEP     nustep S8A9 L3 x 5'  Able to do some without UE Using arms due to pain and decr load    AP/QS/GS/HS  x10 each, reviewed x    SLR 4 way 2x10 ea x    prone hamstring curl 2x10 x    LAQ 2x10 x    Supine hip abd x10 x    heel/toe raises  2x10 x    standing marches   2x10 x      Held today   \" standing wt shifting M/L 2x10 with 5\" hold ea x                                                                       Therapeutic Exercise/Home Exercise Program:   x21 min. Instructed in HEP with handout given. Group Therapy:    0 minutes    Therapeutic Activity:  0 minutes     Gait: 0 minutes    Neuromuscular Re-Education:  0 minutes      Canalith Repositioning Procedure:  0 minutes    Manual Therapy: 10 minutes  Gentle patella mobility and STM     Modalities: declined minutes   [] GAME READY (VASO)- for significant edema, swelling, pain control, to L knee with LE elevated, 34 deg, min pressure     Functional Outcome Measure:   []NA  Measure Used: LEFS  Date Assessed: 2/2/21  Score: 15/80=81%    Assessment/Treatment/Activity Tolerance: slowly working back up to previous ex and added open chain ex  Patients response to treatment:   []Patient tolerated treatment well with no adverse reactions noted    []Patient limited by fatigue   [x]Patient limited by pain    []Patient limited by other medical complications   []Other:    Goals:   Progress towards goals:  Progressing towards all goals. GOALS:  Short Term Goals:  2 weeks  1. Independent in HEP and progression per patient tolerance, in order to prevent re-injury. [x]? Progressing: []? Met: []? Not Met: []? Adjusted            2. Patient will have a decrease in pain to facilitate improvement in movement, function, and ADLs as indicated by Functional Deficits. [x]? Progressing: []? Met: []? Not Met: []? Adjusted               Long Term Goals:  8 weeks  1.  Disability index score of 20% or less for the LEFS functional questionnaire to assist with reaching prior level of function. []? Progressing: []? Met: []? Not Met: []? Adjusted           2. Patient will demonstrate increased AROM L knee =R knee to allow for proper joint functioning as indicated by patients Functional Deficits. []? Progressing: []? Met: []? Not Met: []? Adjusted            3. Patient will demonstrate an increase in strength to 4+/5 or greater in L LE to allow for proper functional mobility as indicated by patients Functional Deficits. []? Progressing: []? Met: []? Not Met: []? Adjusted            4. Patient will return to all transfers, work activities, and functional activities without increased symptoms or restriction. []? Progressing: []? Met: []? Not Met: []? Adjusted            5. Patient will have 0-2/10 pain with ADL's.  []? Progressing: []? Met: []? Not Met: []? Adjusted            6. Patient stated goal: Sleep through the night without waking due to pain  []? Progressing: []? Met: []? Not Met: []?  Adjusted     Prognosis: [x]Good   []Fair   []Poor    Patient Requires Follow-up:  [x]Yes  []No    Plan: []Plan of care initiated     [x]Continue per plan of care    [] Alter current plan (see comments)    []Hold pending MD visit []Discharge    Charges:  Timed Code Treatment Minutes: 31   Total Treatment Minutes:  31   [facility] time for each procedure?:  TE TIME:  NMR TIME:  MANUAL TIME:  UNTIMED MINUTES:       [] EVAL (LOW) 61945 (typically 20 minutes face-to-face)  [] EVAL (MOD) 13899 (typically 30 minutes face-to-face)  [] EVAL (HIGH) 95964 (typically 45 minutes face-to-face)  [] RE-EVAL     [x] WE(73612) x1     [] IONTO  [] NMR (07359) x     [] VASO  [x] Manual (70710) x1     [] Other:  [] TA x      [] Mech Traction (17937)  [] ES(attended) (06565)     [] ES (un) (11692):      Electronically signed by:  Eusebio Morocho, PT, MPT [id number]      Note: If patient does not return for scheduled/ recommended follow up visits, this note will serve as a discharge from care along

## 2021-02-22 DIAGNOSIS — Z98.890 S/P LEFT KNEE ARTHROSCOPY: Primary | ICD-10-CM

## 2021-02-22 RX ORDER — TRAMADOL HYDROCHLORIDE 50 MG/1
50 TABLET ORAL EVERY 6 HOURS PRN
Qty: 28 TABLET | Refills: 0 | Status: SHIPPED | OUTPATIENT
Start: 2021-02-22 | End: 2021-03-01

## 2021-02-23 ENCOUNTER — HOSPITAL ENCOUNTER (OUTPATIENT)
Dept: PHYSICAL THERAPY | Age: 56
Setting detail: THERAPIES SERIES
Discharge: HOME | End: 2021-02-23
Payer: COMMERCIAL

## 2021-02-23 NOTE — FLOWSHEET NOTE
Physical Therapy  Cancellation/No-show Note  Patient Name:  Arleen Holguin  :  1965   Date:  2021  Cancels to Date: 2  No-shows to Date: 0    For today's appointment patient:  [x]  Cancelled  []  Rescheduled appointment  []  No-show     Reason given by patient:  []  Patient ill  []  Conflicting appointment  []  No transportation    []  Conflict with work  [x]  No reason given  []  Other:     Comments:   Electronically signed byANDREI [id number]

## 2021-02-25 ENCOUNTER — HOSPITAL ENCOUNTER (OUTPATIENT)
Dept: PHYSICAL THERAPY | Age: 56
Setting detail: THERAPIES SERIES
Discharge: HOME | End: 2021-02-25
Payer: COMMERCIAL

## 2021-02-25 PROCEDURE — 97140 MANUAL THERAPY 1/> REGIONS: CPT

## 2021-02-25 PROCEDURE — 97110 THERAPEUTIC EXERCISES: CPT

## 2021-02-25 NOTE — FLOWSHEET NOTE
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), s/p L knee arthroscopy (Z98.890)  Treatment Diagnosis:   Limited ROM, weakness, gait and balance deviations, swelling     Onset Date: 1/27/21  Referral Date: 1/28/21  Referring Physician:  Yfn Baum DO     Visits Allowed/Insurance/Certification Information:  Christina Cannon 150 - 30 visits      Restrictions/Precautions:  WBAT    Plan of care sent to provider:   [x]NA   []Faxed   [x]Co-signature       Plan of care signed:    [x]NA   []Yes   []No      Progress report will be due (10 Rx or 30 days whichever is less):  6/4/36    Recertification will be due (POC Duration  / 90 days whichever is less): 5/2/21    Visit# / total visits:     Visit # Insurance Allowable Auth Required   In Person 5/16 30 []  Yes     [x]  No    [facility] 0  []  Yes     []  No    Total 5/16       Plan for Next Session:  Add SLS, gait training      Subjective: pt reports her pain is getting better, tried to stand on concrete and did not last long, has noticed she can stand with more wt on her L leg now     Treatment Plan: 2/22/21  Specific precautions were reviewed and emphasized. Patient will continue outpatient therapy. I encouraged diclofenac twice a day to help alleviate pain and swelling. Also prescribed one final prescription of tramadol to help with pain at night. Continue ice and elevation as needed. Slowly resume activities as tolerated. Follow-up in 2 months for reevaluation. Patient agrees with this plan, all of their questions were answered best of our ability and to their satisfaction.        Pain level: 0-3/10  In the last wk  AT EVAL: fatigue   [x]Patient limited by pain    []Patient limited by other medical complications   []Other:    Goals:   Progress towards goals:  Progressing towards all goals. GOALS:  Short Term Goals:  2 weeks  1. Independent in HEP and progression per patient tolerance, in order to prevent re-injury. [x]? Progressing: []? Met: []? Not Met: []? Adjusted            2. Patient will have a decrease in pain to facilitate improvement in movement, function, and ADLs as indicated by Functional Deficits. [x]? Progressing: []? Met: []? Not Met: []? Adjusted               Long Term Goals:  8 weeks  1. Disability index score of 20% or less for the LEFS functional questionnaire to assist with reaching prior level of function. []? Progressing: []? Met: []? Not Met: []? Adjusted           2. Patient will demonstrate increased AROM L knee =R knee to allow for proper joint functioning as indicated by patients Functional Deficits. []? Progressing: []? Met: []? Not Met: []? Adjusted            3. Patient will demonstrate an increase in strength to 4+/5 or greater in L LE to allow for proper functional mobility as indicated by patients Functional Deficits. []? Progressing: []? Met: []? Not Met: []? Adjusted            4. Patient will return to all transfers, work activities, and functional activities without increased symptoms or restriction. []? Progressing: []? Met: []? Not Met: []? Adjusted            5. Patient will have 0-2/10 pain with ADL's.  []? Progressing: []? Met: []? Not Met: []? Adjusted            6. Patient stated goal: Sleep through the night without waking due to pain  []? Progressing: []? Met: []? Not Met: []?  Adjusted     Prognosis: [x]Good   []Fair   []Poor    Patient Requires Follow-up:  [x]Yes  []No    Plan: []Plan of care initiated     [x]Continue per plan of care    [] Alter current plan (see comments)    []Hold pending MD visit []Discharge    Charges:  Timed Code Treatment Minutes: 30   Total Treatment

## 2021-03-04 ENCOUNTER — HOSPITAL ENCOUNTER (OUTPATIENT)
Dept: PHYSICAL THERAPY | Age: 56
Setting detail: THERAPIES SERIES
Discharge: HOME | End: 2021-03-04
Payer: COMMERCIAL

## 2021-03-04 PROCEDURE — 97140 MANUAL THERAPY 1/> REGIONS: CPT

## 2021-03-04 PROCEDURE — 97110 THERAPEUTIC EXERCISES: CPT

## 2021-03-04 NOTE — FLOWSHEET NOTE
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), s/p L knee arthroscopy (Z98.890)  Treatment Diagnosis:   Limited ROM, weakness, gait and balance deviations, swelling     Onset Date: 1/27/21  Referral Date: 1/28/21  Referring Physician:  Christina Frost DO     Visits Allowed/Insurance/Certification Information:  Christina Cannon 150 - 30 visits      Restrictions/Precautions:  WBAT    Plan of care sent to provider:   [x]NA   []Faxed   [x]Co-signature       Plan of care signed:    [x]NA   []Yes   []No      Progress report will be due (10 Rx or 30 days whichever is less):  [place] w/PT     Recertification will be due (POC Duration  / 90 days whichever is less): 5/2/21    Visit# / total visits:     Visit # Insurance Allowable Auth Required   In Person 6/16 30 []  Yes     [x]  No    [facility] 0  []  Yes     []  No    Total 6/16       Plan for Next Session:        Subjective: pt reports she needs to leave at 3:45,  See below for pain reports. Reports there are times when she can put all of her wt on her involved leg but if she steps funny it can hurt. Treatment Plan: 2/22/21  Specific precautions were reviewed and emphasized. Patient will continue outpatient therapy. I encouraged diclofenac twice a day to help alleviate pain and swelling. Also prescribed one final prescription of tramadol to help with pain at night. Continue ice and elevation as needed. Slowly resume activities as tolerated. Follow-up in 2 months for reevaluation. Patient agrees with this plan, all of their questions were answered best of our ability and to their satisfaction.        Pain level: 0-5/10  In the last wk Brief periods of higher medial pain  AT EVAL: Patient describes pain to be fairly constant in L knee peripatellar region, posterior capsule  Patient reports pain is  2/10 pain at present and  7/10 pain at its worst.  Worsened by: Walking >5 min, standing >5, squatting, stairs, end range motion        Objective:     Supine flex after nustep 0-128    Exercises:    Exercises in bold performed in department today. Items not bolded are carried forward from prior visits for continuity of the record. Exercise/Equipment Resistance/Repetitions Issued for HEP     nustep S8 L4 x 5'       AP/QS/GS/HS  x10 each, reviewed x    SLR 4 way 2x10 ea x    prone hamstring curl 2x10 x    LAQ 2x10 x    Supine hip abd x10 x    heel/toe raises  2x10 on foam x    standing marches   2x10 on foam x    standing hip abd  2x5 B, UE support for std L X       standing hip ext 2x5 B, UE support for std L X       standing wt shifting M/L 2x10 with 5\" hold ea X  Getting easier May try std hip abd/ext next wk and then prog to SLS as jefry    fwd and lateral step up s    Foam 2x10 ea 1 UE support due to a bit tender    SLS   5x10\"  UE support                                                      Therapeutic Exercise/Home Exercise Program:   x13 min. Instructed in HEP with handout given.     Group Therapy:    0 minutes    Therapeutic Activity:  0 minutes     Gait: 2 minutes  less deviations observed but still there    Neuromuscular Re-Education:  0 minutes      Canalith Repositioning Procedure:  0 minutes    Manual Therapy: 8 minutes   STM lateral quad near patella    Modalities: declined minutes   [] GAME READY (VASO)- for significant edema, swelling, pain control, to L knee with LE elevated, 34 deg, min pressure     Functional Outcome Measure:   []NA  Measure Used: LEFS  Date Assessed: 2/2/21  Score: 15/80=81%    Assessment/Treatment/Activity Tolerance: slowly working back up to previous ex and added open chain ex  Patients response to treatment: []Patient tolerated treatment well with no adverse reactions noted    []Patient limited by fatigue   [x]Patient limited by pain    []Patient limited by other medical complications   []Other:    Goals:   Progress towards goals:  Progressing towards all goals. GOALS:  Short Term Goals:  2 weeks  1. Independent in HEP and progression per patient tolerance, in order to prevent re-injury. [x]? Progressing: []? Met: []? Not Met: []? Adjusted            2. Patient will have a decrease in pain to facilitate improvement in movement, function, and ADLs as indicated by Functional Deficits. [x]? Progressing: []? Met: []? Not Met: []? Adjusted               Long Term Goals:  8 weeks  1. Disability index score of 20% or less for the LEFS functional questionnaire to assist with reaching prior level of function. []? Progressing: []? Met: []? Not Met: []? Adjusted           2. Patient will demonstrate increased AROM L knee =R knee to allow for proper joint functioning as indicated by patients Functional Deficits. []? Progressing: []? Met: []? Not Met: []? Adjusted            3. Patient will demonstrate an increase in strength to 4+/5 or greater in L LE to allow for proper functional mobility as indicated by patients Functional Deficits. []? Progressing: []? Met: []? Not Met: []? Adjusted            4. Patient will return to all transfers, work activities, and functional activities without increased symptoms or restriction. []? Progressing: []? Met: []? Not Met: []? Adjusted            5. Patient will have 0-2/10 pain with ADL's.  []? Progressing: []? Met: []? Not Met: []? Adjusted            6. Patient stated goal: Sleep through the night without waking due to pain  []? Progressing: []? Met: []? Not Met: []?  Adjusted     Prognosis: [x]Good   []Fair   []Poor    Patient Requires Follow-up:  [x]Yes  []No    Plan: []Plan of care initiated     [x]Continue per plan of care    [] Alter current plan (see comments)    []Hold pending MD visit []Discharge    Charges:  Timed Code Treatment Minutes: 23   Total Treatment Minutes:  23   [facility] time for each procedure?:  TE TIME:  NMR TIME:  MANUAL TIME:  UNTIMED MINUTES:       [] EVAL (LOW) 65612 (typically 20 minutes face-to-face)  [] EVAL (MOD) 14517 (typically 30 minutes face-to-face)  [] EVAL (HIGH) 09521 (typically 45 minutes face-to-face)  [] RE-EVAL     [x] JH(95037) x1     [] IONTO  [] NMR (41284) x     [] VASO  [x] Manual (45170) x1     [] Other:  [] TA x      [] Mech Traction (64925)  [] ES(attended) (05114)     [] ES (un) (04297):      Electronically signed by:  JOHN [id number]      Note: If patient does not return for scheduled/ recommended follow up visits, this note will serve as a discharge from care along with most recent update on progress.

## 2021-03-09 ENCOUNTER — HOSPITAL ENCOUNTER (OUTPATIENT)
Dept: PHYSICAL THERAPY | Age: 56
Setting detail: THERAPIES SERIES
Discharge: HOME | End: 2021-03-09
Payer: COMMERCIAL

## 2021-03-09 PROCEDURE — 97110 THERAPEUTIC EXERCISES: CPT

## 2021-03-09 NOTE — FLOWSHEET NOTE
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), s/p L knee arthroscopy (Z98.890)  Treatment Diagnosis:   Limited ROM, weakness, gait and balance deviations, swelling     Onset Date: 1/27/21  Referral Date: 1/28/21  Referring Physician:  Golden Hare DO     Visits Allowed/Insurance/Certification Information:  Stephanie Jay visits      Restrictions/Precautions:  WBAT    Plan of care sent to provider:   []NA   []Faxed   [x]Co-signature       Plan of care signed:    []NA   [x]Yes 2/2/21   []No      Progress report will be due (10 Rx or 30 days whichever is less):  4/5/66     Recertification will be due (POC Duration  / 90 days whichever is less): 5/2/21    Visit# / total visits:     Visit # Insurance Allowable Auth Required   In Person 7/16 30 []  Yes     [x]  No    [facility] 0  []  Yes     []  No    Total 7/16       Plan for Next Session:  Continue with strengthening of hip ext, abd, add, flexion and hamstrings, as well as eccentric quad control. Add ankle weights to SLR, sidelying hip abd circles, donkey kicks, fire hydrant leg lifts      Subjective: Doing well. She feels she has made 70% improvement with PT treatment. She is still limited with her tolerance of sitting, standing, or walking >30 min. She is still having issues with swelling. Performing HEP almost daily, and icing 1-2/day. She reports she had to be standing in her mom's barn for 4 hours Sunday night due to a sick horse. She has increased pain and swelling yesterday from all the standing. She is still waking at night occasionally due to pain. She has a hard time \"taking it easy. \" Treatment Plan: 2/22/21  Specific precautions were reviewed and emphasized. Patient will continue outpatient therapy. I encouraged diclofenac twice a day to help alleviate pain and swelling. Also prescribed one final prescription of tramadol to help with pain at night. Continue ice and elevation as needed. Slowly resume activities as tolerated. Follow-up in 2 months for reevaluation. Patient agrees with this plan, all of their questions were answered best of our ability and to their satisfaction. Pain level: 1/10 at rest, 3-4/10 with activity, 7/10 at worst, supra patellar region and medial joint line. AT EVAL: Patient describes pain to be fairly constant in L knee peripatellar region, posterior capsule  Patient reports pain is  2/10 pain at present and  7/10 pain at its worst.  Worsened by: Walking >5 min, standing >5, squatting, stairs, end range motion        Objective:     AROM:  L knee 0-136 in supine after stretching, HS -15  MMT: L hip flex 4/5, hip ext 4-/5, hip abd 4/5, add 4-/5, knee flex 4-/5, knee ext 4+/5  Gait:  Slightly antalgic on L, no AD for community distances  Stairs: Alternating pattern with B rails. Decreased eccentric quad control noted with descending stairs. Exercises:    Exercises in bold performed in department today. Items not bolded are carried forward from prior visits for continuity of the record.   Exercise/Equipment Resistance/Repetitions Issued for HEP     nustep S8 L4 x 5'       AP/QS/GS/HS  x10 each, reviewed x    SLR 4 way 2x10 ea x    prone hamstring curl 3#, 3x10 x    LAQ 3# 3x10 x    Supine hip abd x10 x    heel/toe raises  2x10 on foam x    standing marches   2x10 on foam x    standing hip abd  2x5 B, UE support for std L X       standing hip ext 2x5 B, UE support for std L X       standing wt shifting M/L 2x10 with 5\" hold ea X  Getting easier May try std hip abd/ext next wk and then prog to SLS as jefry    fwd and lateral step ups    4\", 2x15 ea 1 UE support due to a bit tender    SLS   x1 min with arms across chest   UE support     Leg press B LE's 4 plates, 7S00      Leg press  2 plates, 2E52      Backward step ups 4\" x30      Prone quad stretch 30 sec/ x3                         Therapeutic Exercise/Home Exercise Program:   x40 min. Reviewed and progressed exercises as noted above. Assessment done for progress note. Group Therapy:    0 minutes    Therapeutic Activity:  0 minutes     Gait: 5 minutes  Gait:  Slightly antalgic on L, no AD for community distances. Encouraged appropriate heel toe patterns and equal weight shifting. Stairs: Alternating pattern with B rails. Decreased eccentric quad control noted with descending stairs. Neuromuscular Re-Education:  0 minutes      Canalith Repositioning Procedure:  0 minutes    Manual Therapy: 0 minutes      Modalities: declined, requests to ice at home   [] GAME READY (VASO)- for significant edema, swelling, pain control, to L knee with LE elevated, 34 deg, min pressure     Functional Outcome Measure:   []NA  Measure Used: LEFS  Date Assessed: 2/2/21  Score: 15/80=81%    Assessment/Treatment/Activity Tolerance:   Patient making good progress with PT treatment. Improvements noted in all areas, but can further progress. Recommend a continuation of PT treatment 1-2x/week for another 4 weeks, to further progress ROM, strength, gait, balance, and to allow patient to return to prior level of activity without limitations noted. Patients response to treatment:   [x]Patient tolerated treatment well with no adverse reactions noted    []Patient limited by fatigue   []Patient limited by pain    []Patient limited by other medical complications   []Other:    Goals:   Progress towards goals:  STG's MET. Progressing towards LTG's. GOALS:  Short Term Goals:  2 weeks  1. Independent in HEP and progression per patient tolerance, in order to prevent re-injury. []? Progressing: [x]? Met: []? Not Met: []?  Adjusted VASO  [] Manual (15415) x1     [] Other:  [] TA x      [] Mech Traction (35404)  [] ES(attended) (04077)     [] ES (un) (97812):      Electronically signed by:  Sandrine Prado PT, MPT [id number]      Note: If patient does not return for scheduled/ recommended follow up visits, this note will serve as a discharge from care along with most recent update on progress.

## 2021-03-12 ENCOUNTER — HOSPITAL ENCOUNTER (OUTPATIENT)
Dept: PHYSICAL THERAPY | Age: 56
Setting detail: THERAPIES SERIES
Discharge: HOME | End: 2021-03-12
Payer: COMMERCIAL

## 2021-03-12 PROCEDURE — 97035 APP MDLTY 1+ULTRASOUND EA 15: CPT

## 2021-03-12 PROCEDURE — 97530 THERAPEUTIC ACTIVITIES: CPT

## 2021-03-12 PROCEDURE — 97110 THERAPEUTIC EXERCISES: CPT

## 2021-03-12 PROCEDURE — 97016 VASOPNEUMATIC DEVICE THERAPY: CPT

## 2021-03-12 NOTE — FLOWSHEET NOTE
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), s/p L knee arthroscopy (Z98.890)  Treatment Diagnosis:   Limited ROM, weakness, gait and balance deviations, swelling     Onset Date: 1/27/21  Referral Date: 1/28/21  Referring Physician:  Dickson Alpers,      Visits Allowed/Insurance/Certification Information:  Christina Cannon 150 - 30 visits      Restrictions/Precautions:  WBAT    Plan of care sent to provider:   []NA   []Faxed   [x]Co-signature       Plan of care signed:    []NA   [x]Yes 2/2/21, 3/9/21   []No      Progress report will be due (10 Rx or 30 days whichever is less):  6/2/59     Recertification will be due (POC Duration  / 90 days whichever is less): 5/2/21    Visit# / total visits:     Visit # Insurance Allowable Auth Required   In Person 8/16 30 []  Yes     [x]  No    [facility] 0  []  Yes     []  No    Total 8/16       Plan for Next Session:  Continue with strengthening of hip ext, abd, add, flexion and hamstrings, as well as eccentric quad control. Add ankle weights to SLR,  donkey kicks, fire hydrant leg lifts, forward step downs      Subjective: Patient feeling a little irritable today. Didn't sleep well the past 2 nights. She moved from the couch to the bed, and pain woke her both nights due to L knee pain. Icing and performing HEP daily. Added 2# weights to SLR. Treatment Plan: 2/22/21  Specific precautions were reviewed and emphasized. Patient will continue outpatient therapy. I encouraged diclofenac twice a day to help alleviate pain and swelling. Also prescribed one final prescription of tramadol to help with pain at night.  Continue ice and elevation as needed. Slowly resume activities as tolerated. Follow-up in 2 months for reevaluation. Patient agrees with this plan, all of their questions were answered best of our ability and to their satisfaction. Pain level: 0-1/10 at rest, 3-4/10 with activity, 5/10 at worst, supra patellar region and medial joint line. AT EVAL: Patient describes pain to be fairly constant in L knee peripatellar region, posterior capsule  Patient reports pain is  2/10 pain at present and  7/10 pain at its worst.  Worsened by: Walking >5 min, standing >5, squatting, stairs, end range motion        Objective:            Exercises:    Exercises in bold performed in department today. Items not bolded are carried forward from prior visits for continuity of the record. Exercise/Equipment Resistance/Repetitions Issued for HEP     nustep S8 L4 x 7'       AP/QS/GS/HS  x10 each, reviewed x    SLR 4 way 2x10 ea x    prone hamstring curl 4#, 3x10 x    LAQ 4# 3x10 x    Supine hip abd x10 x    heel/toe raises  2x10 on foam x    standing marches   4# 2x10   L hip flex With lunge  x    standing hip abd  2x5 B, UE support for std L X       standing hip ext 2x5 B, UE support for std L X       standing wt shifting M/L 2x10 with 5\" hold ea X  Getting easier May try std hip abd/ext next wk and then prog to SLS as jefry    fwd and lateral step ups    6\", 2x15 ea 1 UE support due to a bit tender    SLS   x1 min with arms across chest, on blue foam,, 30 sec eyes closed  UE support     Leg press B LE's 4 plates, 3G23      Leg press  2 plates, 2S98      Backward step ups 4\" x30      Prone quad stretch/ supine HS stretch 30 sec/ x2 (manual of each)     sidelying hip abd circles (CW/CCW)  x10 each direction                  Therapeutic Exercise/Home Exercise Program:   x45 min. Reviewed and progressed exercises as noted above.       Group Therapy:    0 minutes    Therapeutic Activity:  0 minutes     Gait: 0 minutes      Neuromuscular Re-Education: 0 minutes      Canalith Repositioning Procedure:  0 minutes    Manual Therapy: 0 minutes      Modalities: 25 min  US to L knee medial joint line, 50%, 1.5 w/cm2 for pain and inflammation   [x] GAME READY (VASO)- for significant edema, swelling, pain control, to L knee with LE elevated, 34 deg, min pressure     Functional Outcome Measure:   []NA  Measure Used: LEFS  Date Assessed: 2/2/21  Score: 15/80=81%    Assessment/Treatment/Activity Tolerance:  3/9/21   Patients response to treatment:   [x]Patient tolerated treatment well with no adverse reactions noted    []Patient limited by fatigue   []Patient limited by pain    []Patient limited by other medical complications   [x]Other: pain decreased to 0/10 after treatment    Goals:   Progress towards goals:  STG's MET. Progressing towards LTG's. GOALS:  Short Term Goals:  2 weeks  1. Independent in HEP and progression per patient tolerance, in order to prevent re-injury. []? Progressing: [x]? Met: []? Not Met: []? Adjusted            2. Patient will have a decrease in pain to facilitate improvement in movement, function, and ADLs as indicated by Functional Deficits. []? Progressing: [x]? Met: []? Not Met: []? Adjusted               Long Term Goals:  8 weeks  1. Disability index score of 20% or less for the LEFS functional questionnaire to assist with reaching prior level of function. [x]? Progressing: []? Met: []? Not Met: []? Adjusted           2. Patient will demonstrate increased AROM L knee =R knee to allow for proper joint functioning as indicated by patients Functional Deficits. [x]? Progressing: []? Met: []? Not Met: []? Adjusted            3. Patient will demonstrate an increase in strength to 4+/5 or greater in L LE to allow for proper functional mobility as indicated by patients Functional Deficits. [x]? Progressing: []? Met: []? Not Met: []? Adjusted            4.  Patient will return to all transfers, work activities, and functional activities

## 2021-03-16 ENCOUNTER — HOSPITAL ENCOUNTER (OUTPATIENT)
Dept: PHYSICAL THERAPY | Age: 56
Setting detail: THERAPIES SERIES
Discharge: HOME | End: 2021-03-16
Payer: COMMERCIAL

## 2021-03-16 NOTE — FLOWSHEET NOTE
Physical Therapy  Cancellation/No-show Note  Patient Name:  Celi Barrios  :  1965   Date:  3/16/2021  Cancels to Date: 3  No-shows to Date: 0    For today's appointment patient:  [x]  Cancelled  []  Rescheduled appointment  []  No-show     Reason given by patient:  []  Patient ill  []  Conflicting appointment  []  No transportation    []  Conflict with work  [x]  No reason given  []  Other:     Comments:   Electronically signed by:  Fozia Kaufman, PT, MPT [id number]

## 2021-03-18 ENCOUNTER — HOSPITAL ENCOUNTER (OUTPATIENT)
Dept: PHYSICAL THERAPY | Age: 56
Setting detail: THERAPIES SERIES
Discharge: HOME | End: 2021-03-18
Payer: COMMERCIAL

## 2021-03-18 PROCEDURE — 97035 APP MDLTY 1+ULTRASOUND EA 15: CPT

## 2021-03-18 PROCEDURE — 97110 THERAPEUTIC EXERCISES: CPT

## 2022-09-20 ENCOUNTER — HOSPITAL ENCOUNTER (OUTPATIENT)
Dept: MAMMOGRAPHY | Age: 57
Discharge: HOME | End: 2022-09-20
Payer: COMMERCIAL

## 2022-09-20 DIAGNOSIS — Z12.39 SCREENING BREAST EXAMINATION: ICD-10-CM

## 2022-09-20 PROCEDURE — 77063 BREAST TOMOSYNTHESIS BI: CPT

## 2022-09-21 ENCOUNTER — TELEPHONE (OUTPATIENT)
Dept: MAMMOGRAPHY | Age: 57
End: 2022-09-21

## 2022-09-29 NOTE — FLOWSHEET NOTE
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), s/p L knee arthroscopy (Z98.890)  Treatment Diagnosis:   Limited ROM, weakness, gait and balance deviations, swelling     Onset Date: 1/27/21  Referral Date: 1/28/21  Referring Physician:  Moris Castro,      Visits Allowed/Insurance/Certification Information:  Iris Markham - 30 visits      Restrictions/Precautions:  WBAT    Plan of care sent to provider:   []NA   []Faxed   [x]Co-signature       Plan of care signed:    []NA   [x]Yes 2/2/21, 3/9/21   []No      Progress report will be due (10 Rx or 30 days whichever is less):  0/0/11     Recertification will be due (POC Duration  / 90 days whichever is less): 5/2/21    Visit# / total visits:     Visit # Insurance Allowable Auth Required   In Person 9/16 30 []  Yes     [x]  No    [facility] 0  []  Yes     []  No    Total 9/16       Plan for Next Session:  Continue with strengthening of hip ext, abd, add, flexion and hamstrings, as well as eccentric quad control. Add ankle weights to SLR,  donkey kicks, fire hydrant leg lifts, forward step downs      Subjective: Patient reports she has not been very good at doing her ex the last couple of days. Reports she had to go back down stairs for a couple of days but is back up stairs  Again and is doing better this time    Treatment Plan: 2/22/21  Specific precautions were reviewed and emphasized. Patient will continue outpatient therapy. I encouraged diclofenac twice a day to help alleviate pain and swelling. Also prescribed one final prescription of tramadol to help with pain at night. Continue ice and elevation as needed.  Slowly resume activities as tolerated. Follow-up in 2 months for reevaluation. Patient agrees with this plan, all of their questions were answered best of our ability and to their satisfaction. Pain level: 0-1/10 at rest, 3-4/10 with activity,4/10 at worst, supra patellar region and medial joint line. AT EVAL: Patient describes pain to be fairly constant in L knee peripatellar region, posterior capsule  Patient reports pain is  2/10 pain at present and  7/10 pain at its worst.  Worsened by: Walking >5 min, standing >5, squatting, stairs, end range motion        Objective:            Exercises:    Exercises in bold performed in department today. Items not bolded are carried forward from prior visits for continuity of the record. Exercise/Equipment Resistance/Repetitions Issued for HEP     nustep S8 L5 x 5'       AP/QS/GS/HS  x10 each, reviewed x    SLR 4 way 2#  2x10 ea x    prone hamstring curl 4#, 3x10 x    LAQ 4# 3x10 x    Supine hip abd x10 x    heel/toe raises  2x10 on foam x    standing marches   4# 2x10   L hip flex With lunge  x    standing hip abd  2x5 B, UE support for std L X       standing hip ext 2x5 B, UE support for std L X       standing wt shifting M/L 2x10 with 5\" hold ea X      fwd and lateral step ups    6\", 2x15 ea 0 UE support     SLS   x1 min with arms across chest, on blue foam,, 30 sec eyes closed  UE support     Leg press B LE's 4 plates, 7L62 inc nv      Leg press  3 plates, 7O44   Very challenging but 2 was too easy       Backward step ups 4\" x30      Prone quad stretch/ supine HS stretch 30 sec/ x2 (manual of each)     sidelying hip abd circles (CW/CCW)  x10 each direction                  Therapeutic Exercise/Home Exercise Program:   x35 min. Reviewed and progressed exercises as noted above.       Group Therapy:    0 minutes    Therapeutic Activity:  0 minutes     Gait: 0 minutes      Neuromuscular Re-Education:  0 minutes      Canalith Repositioning Procedure:  0 minutes    Manual Therapy: 0 minutes      Modalities: 10min   gameready in use pt will ice at home  US to L knee medial joint line, 50%, 1.5 w/cm2 for pain and inflammation   [] GAME READY (VASO)- for significant edema, swelling, pain control, to L knee with LE elevated, 34 deg, min pressure     Functional Outcome Measure:   []NA  Measure Used: LEFS  Date Assessed: 2/2/21  Score: 15/80=81%    Assessment/Treatment/Activity Tolerance:  3/9/21   Patients response to treatment:   [x]Patient tolerated treatment well with no adverse reactions noted    []Patient limited by fatigue   []Patient limited by pain    []Patient limited by other medical complications   [x]Other: pain decreased to 0/10 after treatment    Goals:   Progress towards goals:  STG's MET. Progressing towards LTG's. GOALS:  Short Term Goals:  2 weeks  1. Independent in HEP and progression per patient tolerance, in order to prevent re-injury. []? Progressing: [x]? Met: []? Not Met: []? Adjusted            2. Patient will have a decrease in pain to facilitate improvement in movement, function, and ADLs as indicated by Functional Deficits. []? Progressing: [x]? Met: []? Not Met: []? Adjusted               Long Term Goals:  8 weeks  1. Disability index score of 20% or less for the LEFS functional questionnaire to assist with reaching prior level of function. [x]? Progressing: []? Met: []? Not Met: []? Adjusted           2. Patient will demonstrate increased AROM L knee =R knee to allow for proper joint functioning as indicated by patients Functional Deficits. [x]? Progressing: []? Met: []? Not Met: []? Adjusted            3. Patient will demonstrate an increase in strength to 4+/5 or greater in L LE to allow for proper functional mobility as indicated by patients Functional Deficits. [x]? Progressing: []? Met: []? Not Met: []? Adjusted            4.  Patient will return to all transfers, work activities, and functional activities without increased symptoms or restriction. [x]? Progressing: []? Met: []? Not Met: []? Adjusted            5. Patient will have 0-2/10 pain with ADL's. [x]? Progressing: []? Met: []? Not Met: []? Adjusted            6. Patient stated goal: Sleep through the night without waking due to pain  [x]? Progressing: []? Met: []? Not Met: []? Adjusted     Prognosis: [x]Good   []Fair   []Poor    Patient Requires Follow-up:  [x]Yes  []No    Plan: []Plan of care initiated     [x]Continue per plan of care    [] Alter current plan (see comments)    []Hold pending MD visit []Discharge    Charges:  Timed Code Treatment Minutes: 45   Total Treatment Minutes:  45   [facility] time for each procedure?:  TE TIME:  NMR TIME:  MANUAL TIME:  UNTIMED MINUTES:       [] EVAL (LOW) 33890 (typically 20 minutes face-to-face)  [] EVAL (MOD) 72943 (typically 30 minutes face-to-face)  [] EVAL (HIGH) 19686 (typically 45 minutes face-to-face)  [] RE-EVAL     [x] TR(41871) x2  [] IONTO  [] NMR (15087) x     [] VASO  [] Manual (29973) x1     [x] Other:  US  [] TA x      [] Mech Traction (69832)  [] ES(attended) (69433)     [] ES (un) (88414):      Electronically signed by:  Nithin Pennington [id number]      Note: If patient does not return for scheduled/ recommended follow up visits, this note will serve as a discharge from care along with most recent update on progress. palpitations

## 2024-01-02 ENCOUNTER — OFFICE VISIT (OUTPATIENT)
Dept: ORTHOPEDIC SURGERY | Age: 59
End: 2024-01-02
Payer: COMMERCIAL

## 2024-01-02 VITALS — BODY MASS INDEX: 24.84 KG/M2 | WEIGHT: 135 LBS | HEIGHT: 62 IN

## 2024-01-02 DIAGNOSIS — S83.411A SPRAIN OF MEDIAL COLLATERAL LIGAMENT OF RIGHT KNEE, INITIAL ENCOUNTER: ICD-10-CM

## 2024-01-02 DIAGNOSIS — S83.8X1A SPRAIN OF MEDIAL MENISCUS OF RIGHT KNEE, INITIAL ENCOUNTER: ICD-10-CM

## 2024-01-02 DIAGNOSIS — M25.561 ACUTE PAIN OF RIGHT KNEE: Primary | ICD-10-CM

## 2024-01-02 DIAGNOSIS — S83.511A SPRAIN OF ANTERIOR CRUCIATE LIGAMENT OF RIGHT KNEE, INITIAL ENCOUNTER: ICD-10-CM

## 2024-01-02 PROCEDURE — 99213 OFFICE O/P EST LOW 20 MIN: CPT

## 2024-01-02 RX ORDER — HYDROCODONE BITARTRATE AND ACETAMINOPHEN 5; 325 MG/1; MG/1
1 TABLET ORAL EVERY 4 HOURS PRN
Qty: 18 TABLET | Refills: 0 | Status: SHIPPED | OUTPATIENT
Start: 2024-01-02 | End: 2024-01-05

## 2024-01-02 RX ORDER — NAPROXEN 500 MG/1
500 TABLET ORAL 2 TIMES DAILY WITH MEALS
Qty: 28 TABLET | Refills: 1 | Status: SHIPPED | OUTPATIENT
Start: 2024-01-02

## 2024-01-09 ENCOUNTER — HOSPITAL ENCOUNTER (OUTPATIENT)
Dept: MRI IMAGING | Age: 59
Discharge: HOME | End: 2024-01-09
Payer: COMMERCIAL

## 2024-01-09 DIAGNOSIS — M25.561 ACUTE PAIN OF RIGHT KNEE: ICD-10-CM

## 2024-01-09 PROCEDURE — 73721 MRI JNT OF LWR EXTRE W/O DYE: CPT

## 2024-01-11 ENCOUNTER — OFFICE VISIT (OUTPATIENT)
Dept: ORTHOPEDIC SURGERY | Age: 59
End: 2024-01-11

## 2024-01-11 VITALS — HEIGHT: 62 IN | BODY MASS INDEX: 24.84 KG/M2 | WEIGHT: 135 LBS

## 2024-01-11 DIAGNOSIS — S80.01XA CONTUSION OF RIGHT KNEE, INITIAL ENCOUNTER: Primary | ICD-10-CM

## 2024-01-11 DIAGNOSIS — S83.411A SPRAIN OF MEDIAL COLLATERAL LIGAMENT OF RIGHT KNEE, INITIAL ENCOUNTER: ICD-10-CM

## 2024-01-11 DIAGNOSIS — S80.01XA CONTUSION OF RIGHT KNEE, INITIAL ENCOUNTER: ICD-10-CM

## 2024-01-11 SDOH — HEALTH STABILITY: PHYSICAL HEALTH: ON AVERAGE, HOW MANY MINUTES DO YOU ENGAGE IN EXERCISE AT THIS LEVEL?: 30 MIN

## 2024-01-11 SDOH — HEALTH STABILITY: PHYSICAL HEALTH: ON AVERAGE, HOW MANY DAYS PER WEEK DO YOU ENGAGE IN MODERATE TO STRENUOUS EXERCISE (LIKE A BRISK WALK)?: 7 DAYS

## 2024-01-11 NOTE — PROGRESS NOTES
ORTHOPAEDIC SURGERY H&P / CONSULTATION NOTE    Chief complaint:   Chief Complaint   Patient presents with    Knee Pain     R Knee      History of present illness: The patient is a 58 y.o. female with subjective symptoms of right knee pain/swelling. The chief complaint is located at right knee. Duration of symptoms has been for 10 days. The severity of symptoms is rated at 3/10 pain however has been as bad as /10 pain on intake form.  Please see MARCIO Andrade note from after-hours clinic dated 1/2/24.  Patient states that she had missed a step and ultimately her knee gave way on 1/1/2024.  She had pain thereafter.  She states a previous ACL reconstruction of the right knee 25 years ago.  She states dull throbbing achy pain but she did notice swelling thereafter.  She denies symptoms that she had preoperatively however she presents today after referral given MRI findings.  She has been using a walker and has been placing weight on the leg    The patient has tried the below listed items prior to today's consultation for above listed chief complaint.     -   Over-the-counter anti-inflammatories/prescription medication anti-inflammatory.     -   Physical therapy / guided home exercise program -     -   Previous corticosteroid injections    Past medical history:    Past Medical History:   Diagnosis Date    PONV (postoperative nausea and vomiting)         Past surgical history:    Past Surgical History:   Procedure Laterality Date    ANTERIOR CRUCIATE LIGAMENT REPAIR      right     KNEE ARTHROSCOPY Left 01/27/2021    LEFT KNEE VIDEO ARTHROSCOPY MEDIAL PLICA EXCISION,  SYNOVECTOMY, performed by Tanvi Beltrán DO at [facility] OR    SHOULDER ARTHROSCOPY  04/26/2012    right shoulder subacromial decompression, right shoulder distal clavicle resection        Allergies:    Allergies   Allergen Reactions    Cephalexin     Keflex [Cephalexin]          Medications:   Current Outpatient Medications:     naproxen (NAPROSYN) 500

## 2024-01-12 ENCOUNTER — TELEPHONE (OUTPATIENT)
Dept: ORTHOPEDIC SURGERY | Age: 59
End: 2024-01-12

## 2024-01-12 LAB — 25(OH)D3 SERPL-MCNC: 15.4 NG/ML

## 2024-01-12 RX ORDER — ERGOCALCIFEROL 1.25 MG/1
50000 CAPSULE ORAL WEEKLY
Qty: 12 CAPSULE | Refills: 0 | Status: SHIPPED | OUTPATIENT
Start: 2024-01-12

## 2024-01-12 NOTE — TELEPHONE ENCOUNTER
I HAVE LEFT A VM FOR THE PATIENT. SHE NEEDS TO START THE RX WE SENT OVER TO HER PHARMACY. HER RESULTS ARE LISTED BELOW FOR REFERENCE.             Component  Ref Range & Units 1/11/24 1613   Vit D, 25-Hydroxy  >=30 ng/mL 15.4 Low    Comment: <=20 ng/mL.............Deficient  21-29 ng/mL...........Insufficient  >=30 ng/mL..........Sufficient        I HAVE ALSO PLACED A REFERRAL TO ENDOCRINOLOGY FOR HER TO FOLLOW UP. NUMBER IS BELOW. [facility] WOULD LIKE HER TO ESTABLISH CARE WITH THEM  [phone]

## 2024-02-02 ENCOUNTER — HOSPITAL ENCOUNTER (OUTPATIENT)
Dept: PHYSICAL THERAPY | Age: 59
Setting detail: THERAPIES SERIES
Discharge: HOME | End: 2024-02-02
Payer: COMMERCIAL

## 2024-02-02 PROCEDURE — 97530 THERAPEUTIC ACTIVITIES: CPT

## 2024-02-02 PROCEDURE — 97016 VASOPNEUMATIC DEVICE THERAPY: CPT

## 2024-02-02 PROCEDURE — 97161 PT EVAL LOW COMPLEX 20 MIN: CPT

## 2024-02-02 PROCEDURE — 97110 THERAPEUTIC EXERCISES: CPT

## 2024-02-02 NOTE — FLOWSHEET NOTE
quadriceps, and hamstringsto allow for proper muscle and joint use in functional mobility, ADLs and prior level of function  Status: [] Progressing: [] Met: [] Not Met: [] Adjusted  Patient will return to walk 1 mile and navigate 1 flight of stairs without increased symptoms or restriction to work towards return to prior level of function.  Status: [] Progressing: [] Met: [] Not Met: [] Adjusted  Patient will resume normal work/leisure activities without pain.            Status: [] Progressing: [] Met: [] Not Met: [] Adjusted    Overall Progression Towards Functional goals/ Treatment Progress Update:  [] Patient is progressing as expected towards functional goals listed.    [] Progression is slowed due to complexities/Impairments listed.  [] Progression has been slowed due to co-morbidities.  [x] Plan just implemented, too soon (<30days) to assess goals progression   [] Goals require adjustment due to lack of progress  [] Patient is not progressing as expected and requires additional follow up with physician  [] Other:     CHARGE CAPTURE     PT CHARGE GRID   CPT Code (TIMED) minutes # CPT Code (UNTIMED) #     Therex (45527)  20 1  EVAL:LOW (96280 - Typically 20 minutes face-to-face) 1    Neuromusc. Re-ed (40905)    Re-Eval (11282)     Manual (35126)    Estim Unattended (56079)     Ther. Act (62275) 10 1  Mech. Traction (85323)     Gait (91349)    Dry Needle 1-2 muscle (20560)     Aquatic Therex (35212)    Dry Needle 3+ muscle (20561)     Iontophoresis (52249)    VASO (16540) 1    Ultrasound (72111)    Group Therapy (75274)     Estim Attended (95944)    Canalith Repositioning (44114)     Other:    Other:    Total Timed Code Tx Minutes 30 2  2     Total Treatment Minutes 55        Charge Justification:  (25744) THERAPEUTIC EXERCISE - Provided verbal/tactile cueing for activities related to strengthening, flexibility, endurance, ROM performed to prevent loss of range of motion, maintain or improve muscular strength or

## 2024-02-02 NOTE — PLAN OF CARE
[facility]- Outpatient Rehabilitation and Therapy [address] [place] [address], [address], [place] [address] office: [phone] fax: [phone]     Physical Therapy Certification      Dear Ellis Farah MD,    We had the pleasure of evaluating the following patient for physical therapy services at [facility].  A summary of our findings can be found in the initial assessment below.  This includes our plan of care.  If you have any questions or concerns regarding these findings, please do not hesitate to contact me at the office phone number listed above.  Thank you for the referral.     Physician Signature:_______________________________Date:__________________  By signing above (or electronic signature), therapist’s plan is approved by physician       Initial Evaluation   Patient: Clarice Angel (58 y.o. female)   Examination Date: 2024   :  1965 MRN: [id number]   Visit #: 1    Insurance: Payor: [employer] / Plan: [employer] [place] [id number] / Product Type: *No Product type* /   Insurance ID: [id number] - (Commercial)  Secondary Insurance (if applicable):    Treatment Diagnosis:   Decreased R knee ROM, strength, and impaired gait and mobility secondary to tibial fracture    Medical Diagnosis:  Contusion of right knee, initial encounter [S80.01XA]  Sprain of medial collateral ligament of right knee, initial encounter [S83.411A]   Referring Physician: Ellis Farah MD  PCP: Chente Gomes MD                              Precautions/ Contra-indications:           Latex allergy:  NO  Pacemaker:    NO  Contraindications for Manipulation: None  Date of Surgery: n/a   Other:     Red Flags:  None    C-SSRS Triggered by Intake questionnaire:   [x] No, Questionnaire did not trigger screening.   [] Yes, Patient intake triggered further evaluation      [] C-SSRS Screening completed  [] PCP notified via Plan of Care  [] Emergency services notified     Preferred

## 2024-02-14 ENCOUNTER — HOSPITAL ENCOUNTER (OUTPATIENT)
Dept: PHYSICAL THERAPY | Age: 59
Setting detail: THERAPIES SERIES
Discharge: HOME | End: 2024-02-14
Payer: COMMERCIAL

## 2024-02-14 PROCEDURE — 97110 THERAPEUTIC EXERCISES: CPT

## 2024-02-14 PROCEDURE — 97016 VASOPNEUMATIC DEVICE THERAPY: CPT

## 2024-02-14 NOTE — FLOWSHEET NOTE
[facility]- Outpatient Rehabilitation and Therapy [address] [place] [address], [address], [place] [address] office: [phone] fax: [phone]      Physical Therapy: TREATMENT/PROGRESS NOTE   Patient: Clarice Angel (58 y.o. female)   Treatment Date: 2024   :  1965 MRN: [id number]   Visit #: 2   Insurance Allowable Auth Needed   20 - hard max []Yes    [x]No    Insurance: Payor: [employer] / Plan: [employer] [place] 81910 / Product Type: *No Product type* /   80/20 coinsurance   Insurance ID: [id number] - (Commercial)  Secondary Insurance (if applicable):    Treatment Diagnosis:   Decreased R knee ROM, strength, and impaired gait and mobility secondary to tibial fracture    Medical Diagnosis:    Contusion of right knee, initial encounter [S80.01XA]  Sprain of medial collateral ligament of right knee, initial encounter [S83.411A]   Referring Physician: Ellis Farah MD  PCP: Chente Gomes MD                             Plan of care signed: NO    Date of Patient follow up with Physician: not scheduled yet     Progress Report/POC: NO  POC update due: (10 visits /OR AUTH LIMITS, whichever is less)  3/1/2024     R tibial plateau fracture on 24 (Cut and paste Precautions/Contraindications from Eval)    Preferred Language for Healthcare:   [x]English       []other:    SUBJECTIVE EXAMINATION     Patient Report/Comments:   reports her swelling has decreased since increasing her WB - her pain has also decreased     Test used Initial score  2024   Pain Summary VAS 5/10 1-210   Functional questionnaire LEFS 26 / 67%    Other:                OBJECTIVE EXAMINATION     Observation: See Eval    Test measurements: See Eval    Exercises/Interventions: Exercises in bold performed in clinic this date     24: each stage lasts 1 week, 25% WB, 50% WB, then 100% WB w/ B Crutches; then 1 crutch, then none     Therapeutic Ex (16273)  resistance Sets/time Reps Notes/Cues/Progressions

## 2024-02-21 ENCOUNTER — HOSPITAL ENCOUNTER (OUTPATIENT)
Dept: PHYSICAL THERAPY | Age: 59
Setting detail: THERAPIES SERIES
Discharge: HOME | End: 2024-02-21
Payer: COMMERCIAL

## 2024-02-21 PROCEDURE — 97110 THERAPEUTIC EXERCISES: CPT

## 2024-02-21 NOTE — FLOWSHEET NOTE
function, and ADLs as indicated by functional deficits.   Status: [] Progressing: [] Met: [] Not Met: [] Adjusted    Long Term Goals: To be achieved in: 8 weeks  Disability index score of 20% or less for the LEFS to assist with return top prior level of function.    Status: [] Progressing: [] Met: [] Not Met: [] Adjusted  Improve  knee AROM  Flexion and extension to 0-150 degrees or  better to allow for proper joint functioning as indicated by patients functional deficits.  Status: [] Progressing: [] Met: [] Not Met: [] Adjusted  Pt to improve strength to 4+/5 or better of proximal hip, quadriceps, and hamstringsto allow for proper muscle and joint use in functional mobility, ADLs and prior level of function  Status: [] Progressing: [] Met: [] Not Met: [] Adjusted  Patient will return to walk 1 mile and navigate 1 flight of stairs without increased symptoms or restriction to work towards return to prior level of function.  Status: [] Progressing: [] Met: [] Not Met: [] Adjusted  Patient will resume normal work/leisure activities without pain.            Status: [] Progressing: [] Met: [] Not Met: [] Adjusted    Overall Progression Towards Functional goals/ Treatment Progress Update:  [] Patient is progressing as expected towards functional goals listed.    [] Progression is slowed due to complexities/Impairments listed.  [] Progression has been slowed due to co-morbidities.  [x] Plan just implemented, too soon (<30days) to assess goals progression   [] Goals require adjustment due to lack of progress  [] Patient is not progressing as expected and requires additional follow up with physician  [] Other:     CHARGE CAPTURE     PT CHARGE GRID   CPT Code (TIMED) minutes # CPT Code (UNTIMED) #     Therex (47715)  25 2  EVAL:LOW (32083 - Typically 20 minutes face-to-face)     Neuromusc. Re-ed (37092)    Re-Eval (71507)     Manual (23930)    Estim Unattended (74685)     Ther. Act (05751)    [facility] (66898)     Gait

## 2024-02-27 ENCOUNTER — APPOINTMENT (OUTPATIENT)
Dept: PHYSICAL THERAPY | Age: 59
End: 2024-02-27
Payer: COMMERCIAL

## 2024-03-01 ENCOUNTER — APPOINTMENT (OUTPATIENT)
Dept: PHYSICAL THERAPY | Age: 59
End: 2024-03-01
Payer: COMMERCIAL

## 2024-03-05 ENCOUNTER — HOSPITAL ENCOUNTER (OUTPATIENT)
Dept: PHYSICAL THERAPY | Age: 59
Setting detail: THERAPIES SERIES
Discharge: HOME | End: 2024-03-05
Payer: COMMERCIAL

## 2024-03-05 PROCEDURE — 97112 NEUROMUSCULAR REEDUCATION: CPT

## 2024-03-05 PROCEDURE — 97110 THERAPEUTIC EXERCISES: CPT

## 2024-03-05 NOTE — FLOWSHEET NOTE
emphasis/focus on exercise progression, modulating pain, reduce/eliminate soft tissue swelling/inflammation/restriction, static and dynamic balance, and kinesthetic sense and proprioception. Next visit plan to look to DC      Electronically Signed by Jolly Delvalle PTA          Date: 03/05/2024     Note: If patient does not return for scheduled/recommended follow up visits, this note will serve as a discharge from care along with the most recent update on progress.

## 2024-03-08 ENCOUNTER — APPOINTMENT (OUTPATIENT)
Dept: PHYSICAL THERAPY | Age: 59
End: 2024-03-08
Payer: COMMERCIAL

## 2024-04-12 ENCOUNTER — OFFICE VISIT (OUTPATIENT)
Dept: SURGERY | Age: 59
End: 2024-04-12
Payer: COMMERCIAL

## 2024-04-12 VITALS
SYSTOLIC BLOOD PRESSURE: 149 MMHG | DIASTOLIC BLOOD PRESSURE: 78 MMHG | TEMPERATURE: 97.8 F | OXYGEN SATURATION: 98 % | BODY MASS INDEX: 24.69 KG/M2 | HEART RATE: 70 BPM | WEIGHT: 135 LBS

## 2024-04-12 DIAGNOSIS — L98.9 FACIAL SKIN LESION: Primary | ICD-10-CM

## 2024-04-12 PROCEDURE — 99204 OFFICE O/P NEW MOD 45 MIN: CPT

## 2024-04-12 NOTE — PROGRESS NOTES
[facility]    CC: Facial Lesion    Referring Physician: Nadine Menchaca MD.     HPI: This is an 58 y.o.female with a PMHx as delineated below who presents to clinic in consultation for facial lesion. The patient noticed the lesion for approximately 2 months ago. She notes that it has grown in size and is bothersome.  Plastic surgery was consulted for evaluation and treatment.    PMHx:   Past Medical History:   Diagnosis Date    PONV (postoperative nausea and vomiting)      PSHx:   Past Surgical History:   Procedure Laterality Date    ANTERIOR CRUCIATE LIGAMENT REPAIR      right     KNEE ARTHROSCOPY Left 01/27/2021    LEFT KNEE VIDEO ARTHROSCOPY MEDIAL PLICA EXCISION,  SYNOVECTOMY, performed by Tanvi Beltrán DO at [facility] OR    SHOULDER ARTHROSCOPY  04/26/2012    right shoulder subacromial decompression, right shoulder distal clavicle resection     Allergy:   Allergies   Allergen Reactions    Cephalexin     Keflex [Cephalexin]        SHx:   Social History     Socioeconomic History    Marital status:      Spouse name: Not on file    Number of children: Not on file    Years of education: Not on file    Highest education level: Not on file   Occupational History    Not on file   Tobacco Use    Smoking status: Every Day     Current packs/day: 0.50     Average packs/day: 0.5 packs/day for 20.0 years (10.0 ttl pk-yrs)     Types: Cigarettes    Smokeless tobacco: Never   Substance and Sexual Activity    Alcohol use: Yes     Comment: rarely    Drug use: No    Sexual activity: Yes     Partners: Male   Other Topics Concern    Not on file   Social History Narrative    Not on file     Social Determinants of Health     Financial Resource Strain: Not on file   Food Insecurity: Not on file   Transportation Needs: Not on file   Physical Activity: Sufficiently Active (1/11/2024)    Exercise Vital Sign     Days of Exercise per Week: 7 days     Minutes of Exercise per Session: 30 min   Stress:

## 2024-04-15 ENCOUNTER — PREP FOR PROCEDURE (OUTPATIENT)
Dept: SURGERY | Age: 59
End: 2024-04-15

## 2024-04-15 DIAGNOSIS — L98.9 FACE LESION: ICD-10-CM

## 2024-04-17 ENCOUNTER — TELEPHONE (OUTPATIENT)
Dept: SURGERY | Age: 59
End: 2024-04-17

## 2024-04-17 NOTE — TELEPHONE ENCOUNTER
The patient was in the office to see Amparo 4-.    PLAN: We will submit to insurance and work to schedule under local.     I received a surgery letter.     I spoke with Susana BRUNO at [facility] ([phone]) to see if CPT Code 80145 or 57271 require pre certification. CPT Code 97940 does not require pre certification, but CPT Code 60714 does, which I started during our call. I will fax clinicals to [phone]. I will scan the clinicals that I faxed and the fax success into Epic under the media tab.     Reference # [id number]    Date Range 4- to 7-    I lmom for the patient at the home number listed to provide an insurance update.     I will leave this phone note open.

## 2024-04-22 NOTE — TELEPHONE ENCOUNTER
College Age/Young Adult Health        Karmen Douglas  May 29, 2018    Visit Vitals  /70   Pulse 68   Resp 16   Ht 5' 4.5\" (1.638 m)   Wt 58.2 kg   LMP 05/23/2018 (Exact Date)   BMI 21.70 kg/m²     Weight: 128.4 lbs      Congratulations!  As you leave high school for a working career, the , or college, you are beginning one of the most exciting times of your life.  You definitely have more independence, more responsibility, and more exposure to new ideas and experiences, both good and bad.    Most people your age are at one of the healthiest points in their lives.  Complete physical exams, for example, are recommended only every 5-10 years for both males and females.  In addition to complete exams, women need to schedule brief annual exams to ensure reproductive health.  These annual exams can be done by a gynecologist (a specialist in diseases of the female organs), or by your general physician.  Your physical exams may have been done up to now by a pediatrician.  If so, it is time to seek care from an internal medicine specialist (a physician who specializes in the non-surgical care of adults), an internal medicine pediatrician (a physician trained in the non-surgical care of both children and adults), or a family practice physician (a physician trained in the care of children and adults and in pregnancy care).    FOR THOSE STARTING A JOB:  · Health insurance is often a benefit provided by your employer.  If you have trouble getting insurance, start by asking your parents for advice.  · Make sure that you have had three doses of HEPATITIS B vaccine.  Approximately 6,000 people per year die from this viral disease of the liver.  Although Hepatitis B is often transmitted sexually or through contact with contaminated blood products, only one-half of infected individuals can identify a possible source for their infection.  There is no curative treatment, so it is very important to be vaccinated in  I received a fax from [employer] dated 4-. I will scan the fax into Epic under the media tab.    APPROVED  Ref Number [id number]  [facility]   CPT Code 47938  Date Range 4- to 7-    I lmom for the patient at the home number listed. I requested a call back to discuss insurance and surgery scheduling.     I will leave this phone note open.      order to prevent the disease.  · If you will be moving to the [facility] or [facility] to go to school or work, or if you plan to travel outside the [place] for work, school, or vacation, you should have two doses of HEPATITIS A vaccine.  Hepatitis A is a viral liver disease which, while less serious than Hepatitis B, still kills about 100 persons per year in the [place].  It can give you unpleasant stomach symptoms for months.  · Keep an emergency phone contact number in your wallet or purse. It is not usually necessary to know your blood type, but a card listing any serious medical conditions, kept in your wallet or purse, or a medical alert bracelet can be very important.    FOR THOSE GOING INTO THE :  Your healthcare will be provided by the .    FOR THOSE GOING TO COLLEGE:  A physical examination is often required before you arrive on campus.   · Some campuses in urban areas or campuses with many students from other countries require a tuberculosis skin test.  This is usually administered at the time of the physical exam, but it must be \"checked\" by a healthcare professional between 48 and 72 hours after it is applied.   · Make sure that your immunizations are up to date. A DIPHTHERIA/ TETANUS immunization (often abbreviated dT or Td) is recommended every ten years to protect against those diseases. Tetanus is a germ that causes a disease commonly called \"lock-jaw\" because of the severe muscle spasms associated with it. The disease results most often from infected cuts. The death rate, even with modern medical care, is 3 percent. Diphtheria is a severe form of sore throat that used to cause about 5,000 deaths per year in 1900. It still occasionally kills adults who have forgotten to keep their immunizations up to date.    · Most colleges require you either to be immunized against measles and chickenpox (also called Varicella) or to provide proof that you have had those diseases.  Your parents, your doctors, and sometimes the health department will have the information that you need.  · Make sure that you have had three doses of HEPATITIS B vaccine. Approximately 6,000 people per year die from this viral disease of the liver. Although Hepatitis B is often transmitted sexually or through contact with contaminated blood products, only one-half of infected individuals can identify a possible source for their infection. There is no curative treatment, so it is very important to be vaccinated in order to prevent the disease.  · If you will be moving to the [facility] to go to school or work, or if you plan to travel outside the [place] for work, school, or vacation, you should have two doses of HEPATITIS A vaccine. Hepatitis A is a viral liver disease which, while less serious than Hepatitis B, still kills about 100 persons per year in the [place].  It can give you unpleasant stomach symptoms for months.  · Students living in dormitories are four times more likely to contract Meningococcal Meningitis (a bacterial brain infection) than people living elsewhere in the community.  This risk applies only to the first year in the dormitory. The risk for students living in dormitories compared with persons living elsewhere, increases from about one case per 250,000 persons per year to one case per 60,000 persons per year. This would be about one student per year on a large college campus. The death rate is about one in ten, and the brain damage rate is about one in three. A single dose of either meningococcal vaccine - Menactra (R) or Menomune (R) - will lower that risk by 80 percent.  The risk of serious side effects from the vaccine is significantly lower than the risk of the illness.  · Take a copy of your parents' health insurance card along with you to college if you are still covered under their policy.  · Keep an emergency phone contact number in your wallet or purse.   It is not usually necessary to know your blood type, but a card listing any serious medical conditions, kept in your wallet or purse, or a medical alert bracelet can be very important.    HEALTH MAINTENANCE:  There are many things to learn about and experience in young adult life. They occur at all hours of the day and night.  It is often tempting to \"burn the candle at both ends.\"  Young adults are often so excited about these opportunities that they cheat themselves out of sleep, exercise, and a healthy diet. When they do eat, they may overeat or eat too rapidly.    Lack of sleep:  Failure to get enough sleep can cause:  · Low resistance to physical illness.  Many students become sick after prolonged periods with too little sleep.   · Low resistance to mental illness.  College and work are exciting, but any change in your life is stressful.  About 15 percent of young adults experience excessive anxiety.  Common worries include school grades, money, being away from home, and whether or not you are \"normal\" compared to the wider variety of people you meet outside your own community.  Another 15 percent suffer from depression for many of the same reasons, and sometimes without any apparent reason.  Anxiety and depression may be normal responses to stress when they last less than two weeks and correct without treatment.  · Poor grades at school, mistakes in your work, and increased accidents at work.  · Automobile accidents.  Many young people (and older people) overestimate their ability to drive safely when tired.    Lack of exercise:  Failure to get enough exercise may cause many of the same problems as lack of sleep. Light from the sun and exercise are good ways to prevent anxiety and depression. Exercise also helps you sleep better and improves your alertness. This is one of the reasons that many student-athletes do well in school despite having less time for study.    Poor diet:  It is hard to prove that poor  diet leads to short-term health problems other than \"the freshman 15\" (the average weight gain in the freshmen year of college). For obvious reasons, this is less likely at jobs that require physical labor or in the armed services. High-fat diets from junk food can cause grogginess, which leads some people to try to compensate with increased caffeine intake, cigarettes, or even illegal stimulants.    Menstruating females, especially athletes, may do better with a vitamin with both folic acid and iron. About one-third of teen females are low in iron and more tired than necessary, even without being anemic. Folic acid is necessary to prevent birth defects, and there is some evidence that it may lower the risk of heart disease.    HEALTH HAZARDS:  The top five causes of death in the young adult age range in the [place] are accidents, assault, suicide, cancer, and heart trouble.  These conditions cause 85 percent of the deaths in this age range.  Of the top three, accidents cause about 50 percent of the deaths, and assault and suicide cause another 12-15 percent each. Cancer and heart trouble are much rarer.  The heart trouble often has been present since birth. At this point we do not know how to prevent cancer in this age range, although we know that smoking dramatically increases your risk of cancer.  Note that the top three causes can either be prevented or treated. Alcohol in particular is involved in almost 1,400 deaths per year in this age range. 30 percent of surveyed young adults have driven after drinking. Please don't drink and drive.    Assault is not always preventable, but the risk can be reduced. If you are moving to an area that is unfamiliar to you, learn safe habits:  · Talk to others at work or school about dangerous areas. If you are going to college, call the campus security office and ask if there have been dangerous areas on or off campus, especially after dark.  · If a campus or business offers an  escort service from classrooms to dorms or parking lots, use it.  One or more unfortunate incidents usually led to the establishment of the service.  · Never get in a car where the occupants have been drinking.  · If drinking will be part of an evening of entertainment, choose a \"designated .\"  · Females should never accept a drink or even a smoke when they did not see the source. It may seem cool or even economical to have someone buy you drinks and cigarettes, but you run the risk of unknowingly taking \"date rape\" drugs.  Alcoholic drinks themselves are a danger in that one out of every ten college students (male and female) reports an unplanned sexual exposure after drinking.  · When walking on or off campus in areas that are dark, remote, or unfamiliar, travel in groups.  This applies to both males and females. It is more fun anyway.  · Stick to well-lit, well-witnessed, and preferably well-patrolled areas at night.  · Go on \"group dates\" if you are dating and prefer dates in public, visible places. If your date has grown up with violence as a way of solving problems, he or she may seem very nice most of the time but resort to violence when frustrated or disappointed or under stress.  Listen to what your dates say. Watch to see if they treat others with respect. Jealousy is associated most often with violence in males and retaliation for embarrassment in females. It is not entirely clear how often \"date rape\" occurs. Almost one-third of young adults have witnessed violence in their own families. Violence in male-female relationships occurs at similar levels in high school and college. Interestingly, milder forms of violence are perpetrated more often by females and more severe violence (with a weapon) is done more often by males. There is more violence when males and females live together than just in the course of dating. In the sad event that you feel trapped in a violent relationship and cannot bring  yourself to ask for help from parents or from the college, please be aware that there is a national Domestic Violence Hot Line at 1-707.664.1016.    PREGNANCY:  Pregnancy is a BIG deal. A woman who becomes pregnant has a one in 2,000 chance of dying even with modern medical care. Pregnancy is not a risk-free situation. Women who get pregnant outside of marriage have a very high risk of living in poverty for much of their adult lives. It is pretty clear that the woman bears most of the risk. If a couple has sexual intercourse without any form of birth control in this age range during the first two weeks after the menstrual period, there is one chance in 12 that the woman will become pregnant.    About one-half of female high school seniors have had sexual intercourse by graduation.  That number increases to 70 percent in the next four years. When birth control is used correctly, about one sexually active woman out of 100 becomes pregnant each year. Most young adults use birth control inconsistently, which can increase the pregnancy rate to one in 20 sexually active females per year.    If you do decide to use birth control, make sure that you get it from a reputable source. Some Internet birth control pills do not work very well. When someone tells you that a method of birth control is \"safe,\" ask about the types of complications and how often they happen. When someone tells you that a method of birth control is \"effective,\" ask how many women get pregnant per year when using this method. We are not advocating birth control. In fact, abstinence is the only completely effective way of avoiding pregnancy and venereal disease.  But if a couple chooses otherwise, it is best to choose with the most information possible. Please do not let someone encourage you to do something against your wishes.  If they do so, you are not getting the respect you deserve.    SEXUALLY TRANSMITTED DISEASES:   There are approximately 16  million cases per year of venereal disease in the [place] among young people between the ages of 15 and 25. Some are brief and treatable. Some are chronic (herpes), and can only be controlled. Some are fatal (AIDS). Some are treatable but have minimal symptoms, yet can lead to enough permanent damage to the reproductive organs that a woman may have trouble having children (chlamydia). Some can cause cancer (genital warts). Learn about them. Avoid having multiple partners.Try to know as much as possible about your partner if you plan to have a sexual relationship. Stay away from people who are defensive or evasive about their background. Avoid people who say, \"What, don't you like me?' or \"Do I look infected?\" or any of dozens of statements that make you seem unreasonable for asking. When you consider that about one in four persons in your age range has or has had a venereal disease, it is reasonable for you to ask questions and to get honest answers about your partner's sexual history. Don't be cheated and don't be hurt.      SUBSTANCE ABUSE:    Smoking:  About 30 percent of college students smoke. The smoking rate is increasing among young adults, even as it declines among other age groups. 98 percent of smokers in this age range say that they are aware of the hazards of smoking. The reasons why people smoke anyway include advertising, role models (parents, movie stars, bosses, professors), weight control, and stimulation from the effects of nicotine. In addition, half of college smokers blame stress, and a third use smoking as a way of self-medicating depression. The downsides of smoking are well known. Half of all smokers don't reach their life expectancy for a wide variety of reasons. It is a difficult addiction to overcome. Quitting often requires multiple attempts and may be helped by nicotine replacement (gum or patches) or by prescription medicine.     Alcohol:  The rate of alcohol use is about the same for  college students as for other high school graduates. Almost 80 percent of young adults drink alcohol from time to time, but the rate of binge drinking is higher in college students. Binge drinking is defined as having more than five drinks (four for females) at a sitting. 40 percent of college students binge-drink.  80 percent of fraternity members and only a slightly smaller proportion of sorority members binge-drink.  Binge drinking can be habit-forming. One-half of males and one-third of females who engage in binge drinking as young adults still drink heavily at age 30.    Marijuana: Despite many media reports to the contrary, college students are less likely to use marijuana than others the same age.  It is likely that inhaled smoke from marijuana, like every other form of inhaled smoke -  from cigarettes, cars, factories, etc. - eventually will be found to lead to lung cancer. Smoking marijuana causes coughing, wheezing, and bronchitis.  It makes driving dangerous and is at least psychologically addicting (\"I can't relax without it\"). When used chronically, it creates a state of apathy (stereotypically, the \"heavy doper\") that makes achievement of life goals more difficult. Particularly if you think that you might be using marijuana to cope with intense nervousness, fearfulness, sadness, loneliness, or depression, get help from home, from your physician, from your college, or from your employee assistance program.    Other Drugs:  The most frightening illegal drugs are cocaine and amphetamines. They are highly addictive and are associated with violent behavior and mental illness. Because they are illegal, they are not standardized, and deaths and overdoses occur when a drug of unexpectedly high potency is used. Both are associated with occasional strokes, high blood pressure, and paranoia.    Sedatives can be frightening when they are not used for legitimate purposes under the supervision of a physician. As  \"date-rape\" drugs, they are often used to take advantage of others. They have a wide variety of street names and are hard to detect by testing. In overdose they can kill.      People who sell illegal substances are not careful about drug purity or your personal safety.  The drug dealers whom you may encounter at college or work are likely to be more dangerous than the ones who sell to high school students.  YOU NEED TO BE ESPECIALLY CAREFUL ABOUT SMOKING, DRINKING, OR DRUG USE IF THERE IS A HISTORY OF ADDICTION IN YOUR FAMILY.  DISCUSS THIS WITH YOUR PHSYICIAN.    MENTAL HEALTH:  About 15 percent of college students suffer from serious anxiety.  They may have phobias (e.g., specific fears of crowds or germs), panic attacks (rapid onset of panic and often intense fears or intense, uncomfortable trouble with breathing, racing heart, or dizziness), or general restlessness or nervousness.  Another 15 percent have depression for longer than two weeks at a time.  These statistics are not greatly different between college and non-college young adults.  These conditions do need some form of treatment.      Depression has almost one chance in five of leading to suicide if not treated.  If you tend to be sad, take an active role in treating problems when you have them.  You may want to try self-help methods such as exercise, meditation, increased time outdoors, a low-fat diet, avoiding caffeine (especially in the 6 hours prior to sleep), and avoiding unnecessarily stressful situations (intense movies, plays, dares, or dangerous situations).  If these methods do not help, or if you cannot bring yourself to try them, GET PROFESSIONAL HELP.  Get help IMMEDIATELY if you answer \"yes\" to any of the first six of the following questions or \"no,\" \"nothing,\" or \"no one\" to any of the last three questions:    1. Are your negative feelings strong enough that life does not seem worth living?  2. Have you thought about suicide, and if so  are the thoughts frequent or prolonged?  3. Does it take a lot of effort to resist thoughts of suicide?  4. Is the pain/sadness intolerable?   5. Have you ever made any specific suicide plans? (For example, have you planned a location or method, or have you accumulated supplies?)  6. Do you think that life after death would be much better?  7. What holds you back?  8. If it's getting better, could you resist suicide if thoughts returned?  9. Who is the easiest person to turn to if thoughts of suicide come up?    WE REALIZE THAT, AS PHYSICIANS, WE TEND TO FOCUS ON DISEASES AND PROBLEMS.  MOST YOUNG ADULTS HAVE LONG AND HAPPY LIVES, AND THE NEXT YEARS FOR YOU ARE LIKELY TO BE AS INTERESTING, EXCITING, AND POSITIVE AS ANY. WE HERE AT [facility] WISH YOU THE BEST, BUT WE ARE AVAILABLE AND WOULD LIKE TO HELP IF YOU ARE HAVING PROBLEMS.    Thank you for entrusting your care to [facility].

## 2024-04-23 PROBLEM — L98.9 FACE LESION: Status: ACTIVE | Noted: 2024-04-15

## 2024-04-23 NOTE — TELEPHONE ENCOUNTER
I returned the patients call mentioned below. The patient is now scheduled for surgery with  on 5-3-2024.     The patient does not need an H&P due to local anesthesia.    The patient is scheduled for her post op appointment 5-.    I will submit the case request to [facility] today.     I will mail the surgery information and instructions to the patient today.    I will close this phone note.

## 2024-04-26 NOTE — PROGRESS NOTES
Clarice GARCIA Stanley    Age 58 y.o.    female    1965    MRN [id number]    5/3/2024  Arrival Time_____________  OR Time____________60 Min     Procedure(s):  EXCISION OF FACIAL LESION, POSSIBLE ADJACENT TISSUE TRANSFER                      Local    Surgeon(s):  Carlyle Singh, MD       Phone [phone] (home)     [facility]  Date  Info Source  Home  Cell         Work  _____________________________________________________________________  _____________________________________________________________________  _____________________________________________________________________  _____________________________________________________________________  _____________________________________________________________________    PCP _____________________________ Phone_________________     H&P  ________________  Bringing      Chart              Epic      DOS      Called________  EKG ________________   Bringing      Chart              Epic      DOS      Called________  LABS________________   Bringing     Chart              Epic      DOS      Called________  Cardiac Clearance ______ Bringing      Chart              Epic      DOS      Called________  Pulmonary Clearance____ Bringing      Chart              Epic      DOS      Called________    Cardiologist________________________ Phone___________________________  Pulmonologist_______________________Phone___________________________    ? Advance Directives   ? Hindu concerns / Waiver on Chart            [employer]________________  ? Pre-op Instructions Given /Understood          _________________________________  ? Directions to Surgery Center                          _________________________________  ? Transportation Home_______________      __________________________________  ? Crutches/Walker__________________        __________________________________    Orders: Hard copy/ EPIC                 Transcribed/ EPIC              _______Wt.    ________Pharmacy

## 2024-05-02 NOTE — PROGRESS NOTES
Date and time of surgery : 5/3/24 at 1300             Arrival Time: 1100      Bring Picture ID and insurance card.  Please wear simple, loose fitting clothing to the hospital.   Do not bring valuables (money, credit cards, checkbooks, etc.)   Do not wear any makeup (including  eye makeup) and no nail polish or artificial nails on your fingers or toes.  DO NOT wear any jewelry or piercings on day of surgery.  All body piercing jewelry must be removed.  If you have dentures, they will be removed before going to the OR; we will provide you a container.  If you wear contact lenses or glasses, they will be removed; please bring a case for them.  Shower the evening before or morning of surgery with antibacterial soap.  Aspirin, Ibuprofen, Advil, Naproxen, Vitamin E and other Anti-inflammatory products and supplements should be stopped for 5 -7days before surgery or as directed by your physician.  Do not smoke or drink any alcoholic beverages 24 hours prior to surgery.  This includes NA Beer. Refrain from the usage of any recreational drugs, including non-prescribed prescription drugs.   To help prevent infection, change your sheets the night before surgery.   If you  have a Living Will and Durable Power of  for Healthcare, please bring in a copy.  Notify your Surgeon if you develop any illness between now and time of surgery. Cough, cold, fever, sore throat, nausea, vomiting, etc.  Please notify your surgeon if you experience dizziness, shortness of breath or blurred vision between now & the time of your surgery  To provide excellent care visitors will be limited to two per room at any given time. No visitors under the age of 14.  If you use oxygen and have a portable tank please bring it with you the DOS  For your convenience [facility] has a pharmacy on site to fill your prescriptions.     *Please call pre admission testing if you have any further questions             Amanuel      [phone]                             Address: [address] [place] [address]     When you pull into the hospital and are looking at the main hospital entrance, turn right.   We are a tan building to the right of the main entrance.   [facility] over the door.                                                              Revision History

## 2024-05-03 ENCOUNTER — HOSPITAL ENCOUNTER (OUTPATIENT)
Age: 59
Setting detail: OUTPATIENT SURGERY
Discharge: HOME | End: 2024-05-03
Attending: SURGERY | Admitting: SURGERY
Payer: COMMERCIAL

## 2024-05-03 VITALS
HEART RATE: 73 BPM | TEMPERATURE: 97.4 F | OXYGEN SATURATION: 95 % | SYSTOLIC BLOOD PRESSURE: 96 MMHG | BODY MASS INDEX: 23.04 KG/M2 | WEIGHT: 130 LBS | HEIGHT: 63 IN | RESPIRATION RATE: 12 BRPM | DIASTOLIC BLOOD PRESSURE: 73 MMHG

## 2024-05-03 DIAGNOSIS — L98.9 FACE LESION: ICD-10-CM

## 2024-05-03 PROCEDURE — 7100000010 HC PHASE II RECOVERY - FIRST 15 MIN: Performed by: SURGERY

## 2024-05-03 PROCEDURE — 3600000013 HC SURGERY LEVEL 3 ADDTL 15MIN: Performed by: SURGERY

## 2024-05-03 PROCEDURE — 88304 TISSUE EXAM BY PATHOLOGIST: CPT

## 2024-05-03 PROCEDURE — 7100000011 HC PHASE II RECOVERY - ADDTL 15 MIN: Performed by: SURGERY

## 2024-05-03 PROCEDURE — 11441 EXC FACE-MM B9+MARG 0.6-1 CM: CPT | Performed by: SURGERY

## 2024-05-03 PROCEDURE — 13131 CMPLX RPR F/C/C/M/N/AX/G/H/F: CPT | Performed by: SURGERY

## 2024-05-03 PROCEDURE — 3600000003 HC SURGERY LEVEL 3 BASE: Performed by: SURGERY

## 2024-05-03 PROCEDURE — 2709999900 HC NON-CHARGEABLE SUPPLY: Performed by: SURGERY

## 2024-05-03 PROCEDURE — 7100000000 HC PACU RECOVERY - FIRST 15 MIN: Performed by: SURGERY

## 2024-05-03 PROCEDURE — 2500000003 HC RX 250 WO HCPCS: Performed by: SURGERY

## 2024-05-03 RX ORDER — IBUPROFEN 200 MG
TABLET ORAL PRN
Status: DISCONTINUED | OUTPATIENT
Start: 2024-05-03 | End: 2024-05-03 | Stop reason: ALTCHOICE

## 2024-05-03 ASSESSMENT — PAIN - FUNCTIONAL ASSESSMENT: PAIN_FUNCTIONAL_ASSESSMENT: 0-10

## 2024-05-03 NOTE — H&P
[facility]     CC: Facial Lesion     Referring Physician: Nadine Menchaca MD.      HPI: This is an 58 y.o.female with a PMHx as delineated below who presents to clinic in consultation for facial lesion. The patient noticed the lesion for approximately 2 months ago. She notes that it has grown in size and is bothersome.  Plastic surgery was consulted for evaluation and treatment.    Since her last evaluation with Amparo Lozano NP, she notes no changes in her medical history.     PMHx:   Past Medical History        Past Medical History:   Diagnosis Date    PONV (postoperative nausea and vomiting)           PSHx:   Past Surgical History         Past Surgical History:   Procedure Laterality Date    ANTERIOR CRUCIATE LIGAMENT REPAIR         right     KNEE ARTHROSCOPY Left 01/27/2021     LEFT KNEE VIDEO ARTHROSCOPY MEDIAL PLICA EXCISION,  SYNOVECTOMY, performed by Tanvi Beltrán DO at [facility] OR    SHOULDER ARTHROSCOPY   04/26/2012     right shoulder subacromial decompression, right shoulder distal clavicle resection         Allergy:        Allergies   Allergen Reactions    Cephalexin      Keflex [Cephalexin]           SHx:   Social History               Socioeconomic History    Marital status:        Spouse name: Not on file    Number of children: Not on file    Years of education: Not on file    Highest education level: Not on file   Occupational History    Not on file   Tobacco Use    Smoking status: Every Day       Current packs/day: 0.50       Average packs/day: 0.5 packs/day for 20.0 years (10.0 ttl pk-yrs)       Types: Cigarettes    Smokeless tobacco: Never   Substance and Sexual Activity    Alcohol use: Yes       Comment: rarely    Drug use: No    Sexual activity: Yes       Partners: Male   Other Topics Concern    Not on file   Social History Narrative    Not on file      Social Determinants of Health           Financial Resource Strain: Not on file   Food Insecurity:  Not on file   Transportation Needs: Not on file   Physical Activity: Sufficiently Active (1/11/2024)     Exercise Vital Sign      Days of Exercise per Week: 7 days      Minutes of Exercise per Session: 30 min   Stress: Not on file   Social Connections: Not on file   Intimate Partner Violence: Not on file   Housing Stability: Not on file         FHx: Family history of skin CA: none  Meds:   Current Facility-Administered Medications          Current Outpatient Medications   Medication Sig Dispense Refill    vitamin D (ERGOCALCIFEROL) 1.25 MG (56159 UT) CAPS capsule Take 1 capsule by mouth once a week For 12 weeks 12 capsule 0    naproxen (NAPROSYN) 500 MG tablet Take 1 tablet by mouth 2 times daily (with meals) 28 tablet 1    diclofenac (VOLTAREN) 50 MG EC tablet Take 1 tablet by mouth 2 times daily (with meals) 60 tablet 0      No current facility-administered medications for this visit.            ROS   Constitutional: Negative for chills and fever.   HENT: Negative for congestion, facial swelling, and voice change.    Eyes: Negative for photophobia and visual disturbance.   Respiratory: Negative for apnea, cough, chest tightness and shortness of breath.    Cardiovascular: Negative for chest pain and palpitations.   Gastrointestinal: Negative for dysphagia and early satiety.  Genitourinary: Negative for difficulty urinating, dysuria, flank pain, frequency and hematuria.   Musculoskeletal: Negative for new gait problem, joint swelling and myalgias.   Skin: See HPI  Endocrine: negative for tremors, temperature intolerance or polydipsia.  Allergic/Immunologic: Negative for new environmental or food allergies.  Neurological: Negative for dizziness, seizures, speech difficulty, numbness.   Hematological: Negative for adenopathy.   Psychiatric/Behavioral: Negative for agitation and confusion.     EXAM      BP (!) 149/78   Pulse 70   Temp 97.8 °F (36.6 °C)   Wt 61.2 kg (135 lb)   SpO2 98%   BMI 24.69 kg/m²      GEN:

## 2024-05-03 NOTE — PROGRESS NOTES
Pt arrives in PACU resting quietly.  Resp easy and regular.  VS stable.  Pt awake and talking with the staff.  Report from Jasmin GAITAN from OR.

## 2024-05-03 NOTE — OP NOTE
[facility] [place]     OPERATIVE DICTATION    NAME: Clarice Angel   MRN: [id number]  DATE: 5/3/2024     AGE: 58 y.o.    LOCATION: [facility]    PREOPERATIVE DIAGNOSIS:  Left cheek lesion     POSTOPERATIVE DIAGNOSIS:  Same.     OPERATION PERFORMED: 1) Excision of left cheek lesion (0.9 x 0.8 cm)      2) Complex closure of the cheek (2.2 cm)         SURGEON:  Carlyle Singh MD     ANESTHESIA:  Local     ESTIMATED BLOOD LOSS:  Minimal     DRAINS:  None     SPECIMENS: Left cheek lesion     OPERATIVE INDICATIONS:  This is a 58 y.o. female who presented to the office in consultation for a left cheek lesion that has grown in size and caused her discomfort. The patient desired excision and a thorough discussion regarding the risks, benefits, alternatives, outcomes, and personnel involved was performed with the patient.  After all questions were answered to the patient's satisfaction, they agreed to proceed.    OPERATIVE PROCEDURE:  The patient was marked in the preoperative holding area and then brought to the operating room on the eye stretcher. The patient was prepped and draped in the usual sterile manner.  A time-out was performed confirming the patient and the procedure to be performed.  The operation commenced by infiltration of local using a 50:50 mixture of 1% lidocaine with epinephrine and 0.5% marcaine plain. An excision was then performed removing the lesion circumferentially in an elliptical manner.  The lesion was then oriented and sent to pathology after being removed using a 15 blade.  Hemostasis was obtained with electrocautery.      The wound was then closed by widely undermining the periphery to allow for a tension free closure.  The skin was then closed in layers using 4-0 Vicryl and 5-0 Fast Gut sutures.  A sterile dressing was then applied. There were no immediate complications and the patient tolerated the procedure well. At the end of the case, all counts were

## 2024-05-03 NOTE — PROGRESS NOTES
Pre and post operative local expectations and routines explained to patient, verbalized understanding.

## 2024-05-03 NOTE — DISCHARGE INSTRUCTIONS
What is a Surgical Site Infection or  (SSI)?        A surgical site infection (SSI) is an infection that occurs after surgery in the part of the body where the surgery took place. Most patients who have surgery do not develop an infection. However, infections can develop in about 1-3 cases for every 100 patients who have had surgery.  Our goal is for you to NOT experience any complications and be completely satisfied with your care!    However, some signs or symptoms to look for and report immediately to your doctor are:   1. Fever above 101 degrees    2. Redness and increasing pain around the area  where you had surgery   3. Drainage of cloudy fluid or pus coming from the surgical area    Some of the things we/ you can do to prevent SSI's are:   1. Clean hands with soap and water or an alcohol-based hand rub before and after caring for the operative area. This occurs the day of surgery and for the next 2 weeks.   2.Sometimes you receive an appropriate antibiotic within 60 minutes before your surgery or take one for several days after surgery depending on your surgeon's instructions and/or the type of surgery you are having.    3. Family and/or friends who visit you should NOT touch the surgical wound or dressings until advised by your surgeon.    4. Be sure to elevate and decrease the swelling after your surgery to help prevent infection.   5. If you are a diabetic, you need to closely monitor your blood sugar levels and report any significant increases or changes to your surgeon to help promote the healing process.[facility]    Ezekiel Singh MD  [address] [facility] ([address])  [place] [address] [phone]    Post-op Instructions  ___________________________________________    Skin Lesion Removal Instructions    The following instructions will help you know what to expect in the days following your surgery. Do not, however, hesitate to call if you have questions or  first sign of:   Excessive pain associated with pressure.   Bleeding at the incision.   Redness, drainage or odor from the incisions.   Fever or chills.    Go to the ER if you feel   Shortness of breath   Chest pain    Do not hesitate to call if you have any questions or concerns

## 2024-05-07 ENCOUNTER — TELEPHONE (OUTPATIENT)
Dept: SURGERY | Age: 59
End: 2024-05-07

## 2024-05-07 NOTE — TELEPHONE ENCOUNTER
----- Message from Carlyle Singh MD sent at 5/7/2024  7:34 AM EDT -----  Negative for malignancy.    Thanks,  NK

## 2024-05-10 ENCOUNTER — OFFICE VISIT (OUTPATIENT)
Dept: SURGERY | Age: 59
End: 2024-05-10

## 2024-05-10 VITALS
HEART RATE: 70 BPM | TEMPERATURE: 98.7 F | BODY MASS INDEX: 23.03 KG/M2 | WEIGHT: 130 LBS | OXYGEN SATURATION: 98 % | DIASTOLIC BLOOD PRESSURE: 75 MMHG | SYSTOLIC BLOOD PRESSURE: 95 MMHG

## 2024-05-10 DIAGNOSIS — Z09 POSTOP CHECK: Primary | ICD-10-CM

## 2024-05-10 PROCEDURE — 99024 POSTOP FOLLOW-UP VISIT: CPT

## 2024-05-10 NOTE — PROGRESS NOTES
[facility]    PROCEDURE: 1) Excision of left cheek lesion (0.9 x 0.8 cm)  2) Complex closure of the cheek (2.2 cm)  DATE: 5/3/24    Clarice Angel has been recovering well since her procedure. Pain has been well controlled without pain medications.     EXAM    BP 95/75   Pulse 70   Temp 98.7 °F (37.1 °C)   Wt 59 kg (130 lb)   SpO2 98%   BMI 23.03 kg/m²       GEN: NAD   FACE: Incision site healing appropriately. No hematoma/seroma.     PATHOLOGY: FINAL DIAGNOSIS:     Skin of face, excision:      - Follicular cyst, infundibular type with evidence of rupture.     IMP: 58 y.o.female s/p Excision of left cheek lesion with complex closure of the cheek  PLAN: Overall doing well. Patient is happy with results. Will follow up as needed.     Swapna Lozano, APRN - CNP   [facility]  [phone]  05/10/24

## 2024-05-11 ENCOUNTER — APPOINTMENT (OUTPATIENT)
Dept: GENERAL RADIOLOGY | Age: 59
End: 2024-05-11
Payer: COMMERCIAL

## 2024-05-11 ENCOUNTER — HOSPITAL ENCOUNTER (EMERGENCY)
Age: 59
Discharge: ANOTHER HOSPITAL | End: 2024-05-11
Attending: STUDENT IN AN ORGANIZED HEALTH CARE EDUCATION/TRAINING PROGRAM
Payer: COMMERCIAL

## 2024-05-11 ENCOUNTER — APPOINTMENT (OUTPATIENT)
Dept: CT IMAGING | Age: 59
End: 2024-05-11
Payer: COMMERCIAL

## 2024-05-11 ENCOUNTER — HOSPITAL ENCOUNTER (INPATIENT)
Age: 59
LOS: 1 days | Discharge: HOME | DRG: 074 | End: 2024-05-12
Attending: INTERNAL MEDICINE | Admitting: INTERNAL MEDICINE
Payer: COMMERCIAL

## 2024-05-11 VITALS
RESPIRATION RATE: 19 BRPM | SYSTOLIC BLOOD PRESSURE: 137 MMHG | WEIGHT: 138.2 LBS | TEMPERATURE: 97.5 F | OXYGEN SATURATION: 100 % | DIASTOLIC BLOOD PRESSURE: 71 MMHG | BODY MASS INDEX: 24.49 KG/M2 | HEIGHT: 63 IN | HEART RATE: 76 BPM

## 2024-05-11 DIAGNOSIS — I63.9 CEREBROVASCULAR ACCIDENT (CVA), UNSPECIFIED MECHANISM (HCC): Primary | ICD-10-CM

## 2024-05-11 DIAGNOSIS — R29.810 FACIAL DROOP: Primary | ICD-10-CM

## 2024-05-11 PROBLEM — G51.0 BELL'S PALSY: Status: ACTIVE | Noted: 2024-05-11

## 2024-05-11 LAB
ALBUMIN SERPL-MCNC: 4.3 G/DL (ref 3.4–5)
ALBUMIN/GLOB SERPL: 1.4 {RATIO} (ref 1.1–2.2)
ALP SERPL-CCNC: 60 U/L (ref 40–129)
ALT SERPL-CCNC: 10 U/L (ref 10–40)
ANION GAP SERPL CALCULATED.3IONS-SCNC: 9 MMOL/L (ref 3–16)
AST SERPL-CCNC: 17 U/L (ref 15–37)
BASOPHILS # BLD: 0.1 K/UL (ref 0–0.2)
BASOPHILS NFR BLD: 1.3 %
BILIRUB SERPL-MCNC: 0.4 MG/DL (ref 0–1)
BUN SERPL-MCNC: 8 MG/DL (ref 7–20)
CALCIUM SERPL-MCNC: 9.3 MG/DL (ref 8.3–10.6)
CHLORIDE SERPL-SCNC: 103 MMOL/L (ref 99–110)
CO2 SERPL-SCNC: 27 MMOL/L (ref 21–32)
CREAT SERPL-MCNC: 0.6 MG/DL (ref 0.6–1.1)
DEPRECATED RDW RBC AUTO: 13.2 % (ref 12.4–15.4)
EKG ATRIAL RATE: 54 BPM
EKG DIAGNOSIS: NORMAL
EKG P AXIS: -20 DEGREES
EKG P-R INTERVAL: 130 MS
EKG Q-T INTERVAL: 462 MS
EKG QRS DURATION: 84 MS
EKG QTC CALCULATION (BAZETT): 438 MS
EKG R AXIS: 44 DEGREES
EKG T AXIS: 44 DEGREES
EKG VENTRICULAR RATE: 54 BPM
EOSINOPHIL # BLD: 0.2 K/UL (ref 0–0.6)
EOSINOPHIL NFR BLD: 3.2 %
GFR SERPLBLD CREATININE-BSD FMLA CKD-EPI: >90 ML/MIN/{1.73_M2}
GLUCOSE BLD-MCNC: 85 MG/DL (ref 70–99)
GLUCOSE SERPL-MCNC: 83 MG/DL (ref 70–99)
HCT VFR BLD AUTO: 44.5 % (ref 36–48)
HGB BLD-MCNC: 15.1 G/DL (ref 12–16)
LYMPHOCYTES # BLD: 1.6 K/UL (ref 1–5.1)
LYMPHOCYTES NFR BLD: 27.2 %
MCH RBC QN AUTO: 30.4 PG (ref 26–34)
MCHC RBC AUTO-ENTMCNC: 34 G/DL (ref 31–36)
MCV RBC AUTO: 89.6 FL (ref 80–100)
MONOCYTES # BLD: 0.5 K/UL (ref 0–1.3)
MONOCYTES NFR BLD: 8.4 %
NEUTROPHILS # BLD: 3.6 K/UL (ref 1.7–7.7)
NEUTROPHILS NFR BLD: 59.9 %
PERFORMED ON: NORMAL
PLATELET # BLD AUTO: 179 K/UL (ref 135–450)
PMV BLD AUTO: 10.2 FL (ref 5–10.5)
POTASSIUM SERPL-SCNC: 4.1 MMOL/L (ref 3.5–5.1)
PROT SERPL-MCNC: 7.3 G/DL (ref 6.4–8.2)
RBC # BLD AUTO: 4.97 M/UL (ref 4–5.2)
SARS-COV-2 RDRP RESP QL NAA+PROBE: NOT DETECTED
SODIUM SERPL-SCNC: 139 MMOL/L (ref 136–145)
TROPONIN, HIGH SENSITIVITY: 7 NG/L (ref 0–14)
WBC # BLD AUTO: 6 K/UL (ref 4–11)

## 2024-05-11 PROCEDURE — 70498 CT ANGIOGRAPHY NECK: CPT

## 2024-05-11 PROCEDURE — 99285 EMERGENCY DEPT VISIT HI MDM: CPT

## 2024-05-11 PROCEDURE — 71045 X-RAY EXAM CHEST 1 VIEW: CPT

## 2024-05-11 PROCEDURE — 93005 ELECTROCARDIOGRAM TRACING: CPT | Performed by: STUDENT IN AN ORGANIZED HEALTH CARE EDUCATION/TRAINING PROGRAM

## 2024-05-11 PROCEDURE — 93010 ELECTROCARDIOGRAM REPORT: CPT | Performed by: INTERNAL MEDICINE

## 2024-05-11 PROCEDURE — 36415 COLL VENOUS BLD VENIPUNCTURE: CPT

## 2024-05-11 PROCEDURE — 6360000004 HC RX CONTRAST MEDICATION: Performed by: STUDENT IN AN ORGANIZED HEALTH CARE EDUCATION/TRAINING PROGRAM

## 2024-05-11 PROCEDURE — 2580000003 HC RX 258: Performed by: NURSE PRACTITIONER

## 2024-05-11 PROCEDURE — 70450 CT HEAD/BRAIN W/O DYE: CPT

## 2024-05-11 PROCEDURE — 85025 COMPLETE CBC W/AUTO DIFF WBC: CPT

## 2024-05-11 PROCEDURE — 84484 ASSAY OF TROPONIN QUANT: CPT

## 2024-05-11 PROCEDURE — 6370000000 HC RX 637 (ALT 250 FOR IP): Performed by: NURSE PRACTITIONER

## 2024-05-11 PROCEDURE — 80053 COMPREHEN METABOLIC PANEL: CPT

## 2024-05-11 PROCEDURE — 87635 SARS-COV-2 COVID-19 AMP PRB: CPT

## 2024-05-11 PROCEDURE — 1200000000 HC SEMI PRIVATE

## 2024-05-11 RX ORDER — ENOXAPARIN SODIUM 100 MG/ML
40 INJECTION SUBCUTANEOUS DAILY
Status: DISCONTINUED | OUTPATIENT
Start: 2024-05-12 | End: 2024-05-12 | Stop reason: HOSPADM

## 2024-05-11 RX ORDER — POLYETHYLENE GLYCOL 3350 17 G/17G
17 POWDER, FOR SOLUTION ORAL DAILY PRN
Status: DISCONTINUED | OUTPATIENT
Start: 2024-05-11 | End: 2024-05-12 | Stop reason: HOSPADM

## 2024-05-11 RX ORDER — ONDANSETRON 2 MG/ML
4 INJECTION INTRAMUSCULAR; INTRAVENOUS EVERY 6 HOURS PRN
Status: DISCONTINUED | OUTPATIENT
Start: 2024-05-11 | End: 2024-05-12 | Stop reason: HOSPADM

## 2024-05-11 RX ORDER — NICOTINE 21 MG/24HR
1 PATCH, TRANSDERMAL 24 HOURS TRANSDERMAL DAILY PRN
Status: DISCONTINUED | OUTPATIENT
Start: 2024-05-11 | End: 2024-05-12 | Stop reason: HOSPADM

## 2024-05-11 RX ORDER — LABETALOL HYDROCHLORIDE 5 MG/ML
10 INJECTION, SOLUTION INTRAVENOUS EVERY 10 MIN PRN
Status: DISCONTINUED | OUTPATIENT
Start: 2024-05-11 | End: 2024-05-12 | Stop reason: HOSPADM

## 2024-05-11 RX ORDER — ACETAMINOPHEN 325 MG/1
650 TABLET ORAL EVERY 4 HOURS PRN
Status: DISCONTINUED | OUTPATIENT
Start: 2024-05-11 | End: 2024-05-12 | Stop reason: HOSPADM

## 2024-05-11 RX ORDER — CARBOXYMETHYLCELLULOSE SODIUM 10 MG/ML
1 GEL OPHTHALMIC 3 TIMES DAILY PRN
Status: DISCONTINUED | OUTPATIENT
Start: 2024-05-11 | End: 2024-05-12 | Stop reason: HOSPADM

## 2024-05-11 RX ORDER — ASPIRIN 81 MG/1
81 TABLET, CHEWABLE ORAL DAILY
Status: DISCONTINUED | OUTPATIENT
Start: 2024-05-11 | End: 2024-05-12 | Stop reason: HOSPADM

## 2024-05-11 RX ORDER — ONDANSETRON 4 MG/1
4 TABLET, ORALLY DISINTEGRATING ORAL EVERY 8 HOURS PRN
Status: DISCONTINUED | OUTPATIENT
Start: 2024-05-11 | End: 2024-05-12 | Stop reason: HOSPADM

## 2024-05-11 RX ORDER — SODIUM CHLORIDE 0.9 % (FLUSH) 0.9 %
5-40 SYRINGE (ML) INJECTION EVERY 12 HOURS SCHEDULED
Status: DISCONTINUED | OUTPATIENT
Start: 2024-05-11 | End: 2024-05-12 | Stop reason: HOSPADM

## 2024-05-11 RX ORDER — SODIUM CHLORIDE 9 MG/ML
INJECTION, SOLUTION INTRAVENOUS PRN
Status: DISCONTINUED | OUTPATIENT
Start: 2024-05-11 | End: 2024-05-12 | Stop reason: HOSPADM

## 2024-05-11 RX ORDER — ATORVASTATIN CALCIUM 80 MG/1
80 TABLET, FILM COATED ORAL NIGHTLY
Status: DISCONTINUED | OUTPATIENT
Start: 2024-05-11 | End: 2024-05-12 | Stop reason: HOSPADM

## 2024-05-11 RX ORDER — SODIUM CHLORIDE 0.9 % (FLUSH) 0.9 %
5-40 SYRINGE (ML) INJECTION PRN
Status: DISCONTINUED | OUTPATIENT
Start: 2024-05-11 | End: 2024-05-12 | Stop reason: HOSPADM

## 2024-05-11 RX ORDER — ASPIRIN 300 MG/1
300 SUPPOSITORY RECTAL DAILY
Status: DISCONTINUED | OUTPATIENT
Start: 2024-05-11 | End: 2024-05-12 | Stop reason: HOSPADM

## 2024-05-11 RX ADMIN — IOPAMIDOL 75 ML: 755 INJECTION, SOLUTION INTRAVENOUS at 12:17

## 2024-05-11 RX ADMIN — ACETAMINOPHEN 650 MG: 325 TABLET ORAL at 22:19

## 2024-05-11 RX ADMIN — ASPIRIN 81 MG 81 MG: 81 TABLET ORAL at 21:13

## 2024-05-11 RX ADMIN — Medication 10 ML: at 21:14

## 2024-05-11 RX ADMIN — ATORVASTATIN CALCIUM 80 MG: 80 TABLET, FILM COATED ORAL at 21:13

## 2024-05-11 ASSESSMENT — PAIN - FUNCTIONAL ASSESSMENT
PAIN_FUNCTIONAL_ASSESSMENT: ACTIVITIES ARE NOT PREVENTED
PAIN_FUNCTIONAL_ASSESSMENT: 0-10

## 2024-05-11 ASSESSMENT — PAIN DESCRIPTION - ONSET: ONSET: ON-GOING

## 2024-05-11 ASSESSMENT — PAIN DESCRIPTION - ORIENTATION: ORIENTATION: LOWER;POSTERIOR

## 2024-05-11 ASSESSMENT — PAIN DESCRIPTION - FREQUENCY: FREQUENCY: CONTINUOUS

## 2024-05-11 ASSESSMENT — PAIN SCALES - GENERAL
PAINLEVEL_OUTOF10: 2
PAINLEVEL_OUTOF10: 1

## 2024-05-11 ASSESSMENT — PAIN DESCRIPTION - LOCATION
LOCATION: HEAD
LOCATION: NECK

## 2024-05-11 ASSESSMENT — PAIN DESCRIPTION - DESCRIPTORS: DESCRIPTORS: ACHING

## 2024-05-11 ASSESSMENT — PAIN DESCRIPTION - PAIN TYPE: TYPE: ACUTE PAIN

## 2024-05-11 NOTE — ED PROVIDER NOTES
[facility] ED     EMERGENCY DEPARTMENT ENCOUNTER            Pt Name: Clarice Angel   MRN: [id number]   Birthdate 1965   Date of evaluation: 5/11/2024   Provider: Janneth Apple MD   PCP: Chente Gomes MD   Note Started: 11:19 AM EDT 5/11/24          CHIEF COMPLAINT     Chief Complaint   Patient presents with    Facial Droop     Pt states yesterday she had pain at the base of her neck around noon and her right eye was stinging. Pts friend states he noticed she had right side facial droop yesterday around 2200 last evening       HISTORY OF PRESENT ILLNESS:   History from : Patient   Limitations to history : None     Clarice Angel is a 58 y.o. female who presents complaining of right-sided facial droop.  Patient states that symptoms started within the past few days.  The last time she knows she was normal was the evening of the ninth.  States that yesterday, noticed that she was having some right-sided facial droop.  She denies any associated numbness or tingling, rashes or fevers.  She denies difficulty ambulating.  She denies any other complaints or concerns.  Did have recent history of cyst removal in her left cheek with Dr. Singh and has been recovering well.    Nursing Notes were all reviewed and agreed with, or any disagreements were addressed in the HPI.     REVIEW OF SYSTEMS :    Positives and Pertinent negatives as per HPI.      MEDICAL HISTORY   has a past medical history of Arthritis and PONV (postoperative nausea and vomiting).    Past Surgical History:   Procedure Laterality Date    ANTERIOR CRUCIATE LIGAMENT REPAIR Right     ENDOMETRIAL ABLATION      FACIAL SURGERY N/A 5/3/2024    EXCISION OF FACIAL LESION, POSSIBLE ADJACENT TISSUE TRANSFER performed by Carlyle Singh MD at [facility] OR    KNEE ARTHROSCOPY Left 01/27/2021    LEFT KNEE VIDEO ARTHROSCOPY MEDIAL PLICA EXCISION,  SYNOVECTOMY, performed by Tanvi Beltrán DO at [facility] OR    SHOULDER ARTHROSCOPY Right  affect.    DIAGNOSTIC RESULTS     LABS:   Labs Reviewed   COVID-19, RAPID   CBC WITH AUTO DIFFERENTIAL   COMPREHENSIVE METABOLIC PANEL W/ REFLEX TO MG FOR LOW K   TROPONIN   POCT GLUCOSE      When ordered only abnormal lab results are displayed. All other labs were within normal range or not returned as of this dictation.     RADIOLOGY:      Non-plain film images such as CT, Ultrasound and MRI are read by the radiologist. Plain radiographic images are visualized and preliminarily interpreted by the ED Provider with the below findings:   Interpretation per the Radiologist below, if available at the time of this note:  CTA HEAD NECK W CONTRAST   Final Result   No flow limiting stenosis or occlusion within the head or neck.         CT HEAD WO CONTRAST   Final Result   No acute intracranial abnormality.         XR CHEST PORTABLE   Final Result   No acute process.            EKG: The Ekg interpreted by me shows  sinus bradycardia, rate=54  Axis is   Normal  QTc is  normal  Intervals and Durations are unremarkable.      ST Segments: nonspecific changes  No significant change from prior EKG dated 4/24/2012    PROCEDURES   Unless otherwise noted below, none     CRITICAL CARE TIME   I personally saw the patient and independently provided 0 minutes of non-concurrent critical care out of the total shared critical care time excluding separately billable procedures.        Vitals:    Vitals:    05/11/24 1332 05/11/24 1401 05/11/24 1421 05/11/24 1431   BP: 137/67 131/63 123/73 120/63   Pulse: 66 67 82 66   Resp: 19 18 24 13   Temp:       TempSrc:       SpO2: 98% 99% 100% 98%   Weight:       Height:                 Is this patient to be included in the SEP-1 Core Measure due to severe sepsis or septic shock?   No   Exclusion criteria - the patient is NOT to be included for SEP-1 Core Measure due to:  Infection is not suspected       CC/HPI Summary, DDx, ED Course, and Reassessment: Patient is a 58-year-old female, presenting with

## 2024-05-12 ENCOUNTER — APPOINTMENT (OUTPATIENT)
Dept: MRI IMAGING | Age: 59
DRG: 074 | End: 2024-05-12
Attending: INTERNAL MEDICINE
Payer: COMMERCIAL

## 2024-05-12 VITALS
RESPIRATION RATE: 16 BRPM | WEIGHT: 135 LBS | HEART RATE: 57 BPM | TEMPERATURE: 97.9 F | SYSTOLIC BLOOD PRESSURE: 143 MMHG | OXYGEN SATURATION: 97 % | BODY MASS INDEX: 23.92 KG/M2 | DIASTOLIC BLOOD PRESSURE: 71 MMHG | HEIGHT: 63 IN

## 2024-05-12 DIAGNOSIS — S80.01XA CONTUSION OF RIGHT KNEE, INITIAL ENCOUNTER: ICD-10-CM

## 2024-05-12 LAB
CHOLEST SERPL-MCNC: 209 MG/DL (ref 0–199)
EKG ATRIAL RATE: 52 BPM
EKG DIAGNOSIS: NORMAL
EKG P AXIS: 44 DEGREES
EKG P-R INTERVAL: 148 MS
EKG Q-T INTERVAL: 462 MS
EKG QRS DURATION: 82 MS
EKG QTC CALCULATION (BAZETT): 429 MS
EKG R AXIS: 48 DEGREES
EKG T AXIS: 54 DEGREES
EKG VENTRICULAR RATE: 52 BPM
HDLC SERPL-MCNC: 74 MG/DL (ref 40–60)
LDLC SERPL CALC-MCNC: 123 MG/DL
TRIGL SERPL-MCNC: 60 MG/DL (ref 0–150)
VLDLC SERPL CALC-MCNC: 12 MG/DL

## 2024-05-12 PROCEDURE — 70551 MRI BRAIN STEM W/O DYE: CPT

## 2024-05-12 PROCEDURE — 6370000000 HC RX 637 (ALT 250 FOR IP): Performed by: NEUROMUSCULOSKELETAL MEDICINE & OMM

## 2024-05-12 PROCEDURE — 93010 ELECTROCARDIOGRAM REPORT: CPT | Performed by: INTERNAL MEDICINE

## 2024-05-12 PROCEDURE — 99221 1ST HOSP IP/OBS SF/LOW 40: CPT | Performed by: NEUROMUSCULOSKELETAL MEDICINE & OMM

## 2024-05-12 PROCEDURE — 80061 LIPID PANEL: CPT

## 2024-05-12 PROCEDURE — 6360000002 HC RX W HCPCS: Performed by: NURSE PRACTITIONER

## 2024-05-12 PROCEDURE — 36415 COLL VENOUS BLD VENIPUNCTURE: CPT

## 2024-05-12 PROCEDURE — 92610 EVALUATE SWALLOWING FUNCTION: CPT

## 2024-05-12 PROCEDURE — 83036 HEMOGLOBIN GLYCOSYLATED A1C: CPT

## 2024-05-12 PROCEDURE — 97165 OT EVAL LOW COMPLEX 30 MIN: CPT

## 2024-05-12 PROCEDURE — 93005 ELECTROCARDIOGRAM TRACING: CPT | Performed by: NURSE PRACTITIONER

## 2024-05-12 PROCEDURE — 97161 PT EVAL LOW COMPLEX 20 MIN: CPT

## 2024-05-12 PROCEDURE — 6370000000 HC RX 637 (ALT 250 FOR IP): Performed by: NURSE PRACTITIONER

## 2024-05-12 RX ORDER — PREDNISONE 10 MG/1
TABLET ORAL
Qty: 45 TABLET | Refills: 0 | Status: SHIPPED | OUTPATIENT
Start: 2024-05-13 | End: 2024-05-23

## 2024-05-12 RX ORDER — ACYCLOVIR 200 MG/1
400 CAPSULE ORAL
Qty: 68 CAPSULE | Refills: 0 | Status: SHIPPED | OUTPATIENT
Start: 2024-05-12 | End: 2024-05-19

## 2024-05-12 RX ORDER — CARBOXYMETHYLCELLULOSE SODIUM 10 MG/ML
1 GEL OPHTHALMIC 3 TIMES DAILY PRN
Qty: 1 EACH | Refills: 1 | Status: SHIPPED | OUTPATIENT
Start: 2024-05-12

## 2024-05-12 RX ORDER — TETRAHYDROZOLINE HCL 0.05 %
2 DROPS OPHTHALMIC (EYE) 4 TIMES DAILY
Qty: 10 ML | Refills: 4 | Status: SHIPPED | OUTPATIENT
Start: 2024-05-12

## 2024-05-12 RX ORDER — ACYCLOVIR 200 MG/1
400 CAPSULE ORAL
Status: DISCONTINUED | OUTPATIENT
Start: 2024-05-12 | End: 2024-05-12 | Stop reason: HOSPADM

## 2024-05-12 RX ORDER — TETRAHYDROZOLINE HCL 0.05 %
2 DROPS OPHTHALMIC (EYE) 4 TIMES DAILY
Status: DISCONTINUED | OUTPATIENT
Start: 2024-05-12 | End: 2024-05-12 | Stop reason: HOSPADM

## 2024-05-12 RX ORDER — PANTOPRAZOLE SODIUM 40 MG/1
40 TABLET, DELAYED RELEASE ORAL
Qty: 15 TABLET | Refills: 0 | Status: SHIPPED | OUTPATIENT
Start: 2024-05-12 | End: 2024-05-27

## 2024-05-12 RX ORDER — PREDNISONE 20 MG/1
60 TABLET ORAL DAILY
Status: DISCONTINUED | OUTPATIENT
Start: 2024-05-12 | End: 2024-05-12 | Stop reason: HOSPADM

## 2024-05-12 RX ORDER — ERGOCALCIFEROL 1.25 MG/1
50000 CAPSULE ORAL WEEKLY
Status: DISCONTINUED | OUTPATIENT
Start: 2024-05-12 | End: 2024-05-12 | Stop reason: HOSPADM

## 2024-05-12 RX ORDER — ERGOCALCIFEROL 1.25 MG/1
50000 CAPSULE ORAL WEEKLY
Qty: 5 CAPSULE | Refills: 0 | Status: SHIPPED | OUTPATIENT
Start: 2024-05-19 | End: 2024-07-14

## 2024-05-12 RX ADMIN — ACYCLOVIR 400 MG: 200 CAPSULE ORAL at 11:06

## 2024-05-12 RX ADMIN — ASPIRIN 81 MG 81 MG: 81 TABLET ORAL at 08:11

## 2024-05-12 RX ADMIN — ERGOCALCIFEROL 50000 UNITS: 1.25 CAPSULE ORAL at 11:05

## 2024-05-12 RX ADMIN — B-COMPLEX W/ C & FOLIC ACID TAB 1 TABLET: TAB at 11:06

## 2024-05-12 RX ADMIN — PREDNISONE 60 MG: 20 TABLET ORAL at 11:06

## 2024-05-12 RX ADMIN — TETRAHYDROZOLINE HCL 2 DROP: 0.05 SOLUTION/ DROPS OPHTHALMIC at 11:06

## 2024-05-12 RX ADMIN — ENOXAPARIN SODIUM 40 MG: 100 INJECTION SUBCUTANEOUS at 08:11

## 2024-05-12 RX ADMIN — ACETAMINOPHEN 650 MG: 325 TABLET ORAL at 08:14

## 2024-05-12 ASSESSMENT — PAIN SCALES - GENERAL: PAINLEVEL_OUTOF10: 2

## 2024-05-12 ASSESSMENT — PAIN DESCRIPTION - LOCATION: LOCATION: NECK;HEAD

## 2024-05-12 NOTE — DISCHARGE SUMMARY
Discharge Summary  Ehsan Shabbir, MD     Name:  Clarice Angel /Age/Sex: 1965  (58 y.o. female)   MRN & CSN:  [id number] & [id number] Admission Date/Time: 2024  7:56 PM   Attending:  Shabbir, Ehsan, MD Discharging Physician: Ehsan Shabbir, MD     Hospital Course:   8-year-old female with no significant past medical history presented to ER for right facial drooping, admitted for stroke workup        Right facial palsy.  POA, MRI brain nonacute.  Evaluated by neurology, likely Bell's palsy.  Noted recommendations for discharge on vitamin D/vitamin K/prednisone the patient had a chance to ask questions which were answered. she understands the above plan/acyclovir.  Outpatient follow-up with PCP     Sinus bradycardia POA.  Heart rate between 50- 55.  Troponin nonacute, asymptomatic with normal blood pressure.  No acute inpatient plan at this time.  Patient reports this is how her normal heart rate is     Nicotine use        DVT prophy Lovenox  Full code    The patient expressed appropriate understanding of and agreement with the discharge recommendations, medications, and plan.     Consults this admission:  IP CONSULT TO NEUROLOGY    Discharge Instruction:     Follow up appointments:      No follow-up provider specified.    Primary care physician:  within 2 weeks    Diet:  regular diet     Activity: activity as tolerated and no driving for today    Disposition: Discharged to:   [x]Home , []HHC, []SNF, []Acute Rehab, []Hospice     Condition on discharge: Stable    Discharge Medications:        Medication List      You have not been prescribed any medications.          Objective Findings at Discharge:     BP (!) 143/71   Pulse 57   Temp 97.9 °F (36.6 °C) (Oral)   Resp 16   Ht 1.6 m (5' 2.99\")   Wt 61.2 kg (135 lb)   SpO2 97%   BMI 23.92 kg/m²            PHYSICAL EXAM     GEN:  Awake female, sitting upright in bed in no apparent distress.  RESP:  CTAB, no  wheezes, rales or rhonchi.  CVS:   RRR. No

## 2024-05-12 NOTE — PLAN OF CARE
SLP completed evaluation. Please refer to notes in EMR.   Diane Grimm M.S. [facility]-SLP #[id number]  Speech Language Pathologist

## 2024-05-12 NOTE — CONSULTS
be seen without signs of infection or redness or erythema  Neck soft and supple    Assessment and plan:    Right lower motor neuron facial palsy; Bell's palsy  Vitamin D deficiency      Patient is reassured about the benign nature of this diagnosis she can be discharged home for follow-up with neurology after 1 month.    Refer to physical therapy as outpatient.    Prednisone 60 mg daily for five days followed by a five-day taper by 10 mg per day   Acyclovir 400 mg five times daily for 10 days   B complex multivitamin daily for 20 days  Vitamin D 50,000 units weekly for 8 weeks starting today  Ophthalmic moisturizers; 2 drops in the right eye every 2 -3 hours and moisturizer of gel at bedtime with closing the eyes before sleep  Home exercises of the right facial muscles advised    Thank you for consulting    Nasim Keane MD  Neurology

## 2024-05-12 NOTE — H&P
Hospital Medicine History & Physical        Date of Service: 05/11/2024    Time of Service: 2000    Disposition:    [x]Admitted to inpatient status with expected LOS greater than two midnights due to medical therapy.  []Placed in observation status.    Historian: Self/patient, chart review, Care Everywhere, ED documentation    Chief Admission Complaint: Right facial drooping    Presenting Admission History:      Clarice Angel is a/an 58 y.o. female with no significant past medical history who presents to [facility] as a direct admit from [facility]'s emergency department after presenting there earlier today due to right facial drooping.  She notes that her  told her about the drooping last night, but she endorses that she was in a bad mood and just wanted to go to bed instead of worrying about her face.  She states when she woke up this morning the right facial drooping continued along with difficulty closing her right eye so her right eye felt irritated as well.  She admits that she attempted to drink something earlier today, had some spillage coming out of the right corner of her mouth as well.  She denies any preceding symptoms yesterday or day before, however, she had attended a conference a few days ago and was carrying a heavy backpack on her right shoulder so she did have some mild occipital area muscle tension on the right side.  She denies any visual changes in the eye, no extremity weakness or numbness, no numbness in the face.  Lastly, she had a recent cyst removal on the left cheek that was anesthetized with local anesthetic only; patient notes this has been healing well without any complication.  Her evaluation at [facility] included laboratory studies, EKG, noncontrast CT of the head, CT of the head and neck, and chest x-ray.  All imaging was negative for any acute findings; negative NCCT, negative CT of the head neck without any critical LVO.  It is noted that she had low

## 2024-05-12 NOTE — PROGRESS NOTES
Ehsan Shabbir, MD    Hospitalist Progress Note      Name:  Clarice Angel /Age/Sex: 1965  (58 y.o. female)   MRN & CSN:  [id number] & [id number] Admission Date/Time: 2024  7:56 PM   Location:  0364/0364-01 PCP: Chente Gomes MD       I saw and examined the patient on 2024 at 9:58 AM.    Hospital Day: 2  Barriers to discharge: 2D echo, cardiac workup  Assessment and Plan:     58-year-old female with no significant past medical history presented to ER for right facial drooping, admitted for stroke workup      Right facial palsy.  POA, MRI brain nonacute.  Evaluated by neurology, likely Bell's palsy.  On prednisone/acyclovir.    Sinus bradycardia POA.. Troponin nonacute, echo ordered follow-up cardiology recommendations    Nicotine use      DVT prophy Lovenox  Full code       Subjective:   Patient seen and examined at bedside.  Laying comfortably in bed not in acute distress.  No acute events reported overnight.  Patient denies any chest pain, fever, headache, shortness of breath, abdominal pain, nausea or vomiting, diarrhea or new extremity weakness      Objective:     Intake/Output Summary (Last 24 hours) at 2024 0958  Last data filed at 2024 0940  Gross per 24 hour   Intake 740 ml   Output --   Net 740 ml      Vitals:   Vitals:    24 0800   BP:    Pulse:    Resp:    Temp: 98.3 °F (36.8 °C)   SpO2:        Ten point ROS reviewed negative, unless as noted above    Physical Exam:     GENERAL APPEARANCE: not in any acute distress,  appears comfortable.   NECK: Supple, no JVD.  CARDIOVASCULAR: Regular rate and rhythm, no murmurs, rubs or gallops  LUNGS: clear to auscultation bilaterally   ABDOMEN: normoactive bowel sounds, soft non-tender and non distended  EXTREMITIES: No edema  MUSCULOSKELETAL S: normal movement and normal bulk in all ext  NEUROLOGIC: Alert and oriented x 3- grossly non focal exam other than right-sided facial palsy    Electronically signed by Ehsan Shabbir, MD on 
Assessment complete. VSS. Medicated for pain in neck. BP elevated. R facial droop present. MRI scheduled for this morning.   
Discharge instructions given to pt and spouse at bedside. Pt aware RX sent to her pharmacy to be picked up. Pt aware of f/u appt with PCP and no driving until symptoms resolved. IV removed no complications. Tele box removed returned. Lockbox emptied. Belongings gathered.   
Informed cross cover MD regarding patient BP of 174/78. MD replied and said to repage primary at 7 am.  
Occupational Therapy  Facility/Department: [place] [id number] - MED SURG  Occupational Therapy Initial/discharge Assessment    Name: Clarice Angel  : 1965  MRN: [id number]  Date of Service: 2024    Discharge Recommendations:  Home independently          Patient Diagnosis(es): The encounter diagnosis was Cerebrovascular accident (CVA), unspecified mechanism (HCC).  Past Medical History:  has a past medical history of Arthritis and PONV (postoperative nausea and vomiting).  Past Surgical History:  has a past surgical history that includes Anterior cruciate ligament repair (Right); Shoulder arthroscopy (Right, 2012); Knee arthroscopy (Left, 2021); Endometrial ablation; and Facial Surgery (N/A, 5/3/2024).           Assessment      No Skilled OT: Independent with ADL's  REQUIRES OT FOLLOW-UP: No  Activity Tolerance  Activity Tolerance: Patient Tolerated treatment well  Activity Tolerance Comments: vitals stable on RA        Plan   OT eval only     Restrictions  Restrictions/Precautions  Restrictions/Precautions: General Precautions  Position Activity Restriction  Other position/activity restrictions: tele,    Subjective   General  Chart Reviewed: Yes  Patient assessed for rehabilitation services?: Yes  Family / Caregiver Present: No  Referring Practitioner: Demond Alanis APRN - CNP  Diagnosis: Right facial droop; ? Bell's Palsy  General Comment  Comments: RN cleared pt for OT eval; pt resting in bed, agreeable to OT  denies at rest.  Social/Functional History  Social/Functional History  Lives With: Spouse (pt and spouse work from home.)  Type of Home: House  Home Layout: Two level, Bed/Bath upstairs  Home Access: Stairs to enter without rails  Entrance Stairs - Number of Steps: 1 KIMBERLY  Entrance Stairs - Rails: None  Bathroom Shower/Tub: Walk-in shower  Bathroom Toilet: Standard  Home Equipment: Crutches (crutches from tibial fracture.)  Has the patient had two or more falls in the past year or any fall 
Patient admitted to room 364 from [facility].  Patient oriented to room, call light, bed rails, phone, lights and bathroom.  Patient instructed about the schedule of the day including: vital sign frequency, lab draws, possible tests, frequency of MD and staff rounds, including RN/MD rounding together at bedside, daily weights, and I &O's.  Patient instructed about prescribed diet, how to use 8MENU, and television.  With bed alarm in place, patient aware of placement and reason.  With Telemetry box  in place, patient aware of placement and reason.  Bed locked, in lowest position, side rails up 2/4, call light within reach.  Will continue to monitor.     
Physical Therapy  Facility/Department: [place] [id number] - MED SURG  Physical Therapy Initial Assessment/DC summary    Name: Clarice Angel  : 1965  MRN: [id number]  Date of Service: 2024    Discharge Recommendations:  Home independently   PT Equipment Recommendations  Equipment Needed: No  Other: do not anticipate any needs.      Patient Diagnosis(es): The encounter diagnosis was Cerebrovascular accident (CVA), unspecified mechanism (HCC).  Past Medical History:  has a past medical history of Arthritis and PONV (postoperative nausea and vomiting).  Past Surgical History:  has a past surgical history that includes Anterior cruciate ligament repair (Right); Shoulder arthroscopy (Right, 2012); Knee arthroscopy (Left, 2021); Endometrial ablation; and Facial Surgery (N/A, 5/3/2024).    Assessment   Body Structures, Functions, Activity Limitations Requiring Skilled Therapeutic Intervention:  (no notable deficits aside from facial paralysis.)  Assessment: Pt seen in conjunction with OT to maximize pt safety and mobility and safely assess pt maximal level of mobility.  Pt [facility] to [facility] with primary diagnosis of bell's pasly.  PTA pt lived at home with spouse with 1 KIMBERLY, reports IND with mobility without AD.  Currently pt demonstrates IND for bed mobility, IND for functional transfers without AD, IND for ambulation up to 200 feet without AD, IND to ascend and descend 9 steps without HR.  Pt demonstrates no notable PT deficits to be addressed in the acute care setting, only notable deficits is facial paralysis.  Recommend DC to home IND when medically stable.  Treatment Diagnosis: no notable defictis aside from facial paralysis.  Therapy Prognosis: Excellent  Decision Making: Low Complexity  No Skilled PT: Independent with functional mobility   Requires PT Follow-Up: No  Activity Tolerance  Activity Tolerance: Patient tolerated evaluation without incident;Patient tolerated treatment well     Plan   Physical 
TODAY'S DATE:  5/12/2024      Current NIHSS 1      Discussed personal risk factors for Stroke/TIA with patient/family, and ways to reduce the risk for a recurrent stroke.     Patient's personal risk factors which were identified are:   []   Alcohol Abuse: check with your physician before any alcohol consumption.   []   Atrial fibrillation: may cause blood clots  []   Drug Abuse: Seek help, talk with your doctor  []   Clotting Disorder  []   Diabetes  []   Family history of stroke or heart disease  []   High Blood Pressure/Hypertension: work with your physician  []   High cholesterol: monitor cholesterol levels with your physician  []   Overweight/Obesity: work with your physician for your ideal body weight  []   Physical Inactivity: get regular exercise as directed by your physician  []   Personal history of previous TIA or stroke  [x]   Poor Diet: decrease salt (sodium) in your diet, follow diet directed by physician  [x]   Smoking: stop smoking      Reviewed the Following Education with Patient and/or Family:   - Signs and Symptoms of Stroke  - Personal risk factors   - How to activate EMS (911)   - Importance of Follow Up Appointments at Discharge   - Importance of Compliance with Medications Prescribed at Discharge  - Available community resources and stroke advocacy groups if needed    Patient and/or family member: verbalized understanding.     Stroke Education booklet given to patient/family (or verified, if given already), which reviews above information. yes         Electronically signed by Jazmine Bullard RN on 5/12/2024 at 4:31 AM       
Transport here to take pt to MRI, [facility] notified.  
dysphagia: N/A  Precipitating dysphagia risk factors: acute neurological event    Patient Complaints:   Odynophagia: [] Yes [x] No  Globus Sensation: [] Yes [x] No  Reflux Sx's: [] Yes [x] No  SOB with PO intake: [] Yes [x] No  Increased WOB with PO intake: [] Yes [x] No  Coughing/Choking with PO intake: [] Yes [x] No  Weight loss: [] Yes [x] No  Reduced Appetite: [] Yes [x] No  Premature satiety: [] Yes [x] No  Recent and/or Recurrent PNA: [] Yes [x] No  Changes in voice/vocal quality: [] Yes [x] No  Trouble with Mastication: [x] Yes [] No  (biting her lip)  Avoidance of certain foods due to trouble swallowing/chewing: [] Yes [x] No    Baseline Method of Oral Meds: whole with liquid wash    Additional Reported Symptoms/Complaints/Hospital Course: patient MRI negative, dx bell's palsy    Vitals/labs:   Temp: 97.9  SpO2: 97  RR: 16  BP: 143/71  HR: 57  O2 device: RA    Lab Values:    CBC:   Recent Labs     05/11/24  1130   WBC 6.0   HGB 15.1         BMP:  Recent Labs     05/11/24  1130      K 4.1      CO2 27   BUN 8   CREATININE 0.6   GLUCOSE 83          Cranial nerve exam:   CN V (trigeminal): ophthalmic, maxillary, and mandibular facial sensation- Impaired R  CN VII (facial): Impaired R  CN IX/X (glossopharyngeal/vagus): vocal quality: WNL; cough: Strong-perceptually  CN XII (hypoglossal): WNL    Laryngeal function exam:   Secretions: WNL  Vocal quality: See CN exam above  Cough: See CN exam above    Oral Care Status:    [x] Oral Care WFL  [] Poor oral care status  [] Edentulous  [] Upper Dentures  [x] Lower Dentures  [] Missing/Broken Teeth  [] Evidence of dental cavities/carries    PO trials:   IDDSI 0 (thin): ice chip, thin water by straw    IDDSI 4 (puree): applesauce    IDDSI 7 (regular): sandy cracker    3 oz water: FAIL-delayed weak throat clear x1      Dysphagia Diagnosis: Mild-moderate oral stage dysphagia        Impressions: Patient presents with bell's palsy, significant R sided

## 2024-05-12 NOTE — DISCHARGE INSTRUCTIONS
Do not drive until your presenting symptoms have resolved.  Follow-up with your primary care physician in 1 week

## 2024-05-13 ENCOUNTER — TELEPHONE (OUTPATIENT)
Dept: ORTHOPEDIC SURGERY | Age: 59
End: 2024-05-13

## 2024-05-13 LAB
EST. AVERAGE GLUCOSE BLD GHB EST-MCNC: 91.1 MG/DL
HBA1C MFR BLD: 4.8 %

## 2024-05-13 RX ORDER — ERGOCALCIFEROL 1.25 MG/1
CAPSULE ORAL
Qty: 12 CAPSULE | OUTPATIENT
Start: 2024-05-13

## 2025-03-21 ENCOUNTER — HOSPITAL ENCOUNTER (OUTPATIENT)
Dept: MAMMOGRAPHY | Age: 60
Discharge: HOME | End: 2025-03-21
Payer: COMMERCIAL

## 2025-03-21 VITALS — WEIGHT: 139 LBS | BODY MASS INDEX: 25.58 KG/M2 | HEIGHT: 62 IN

## 2025-03-21 DIAGNOSIS — Z12.31 SCREENING MAMMOGRAM FOR BREAST CANCER: ICD-10-CM

## 2025-03-21 PROCEDURE — 77063 BREAST TOMOSYNTHESIS BI: CPT

## (undated) DEVICE — TUBING PMP IRRIG GOFLO

## (undated) DEVICE — GAUZE,SPONGE,4"X4",16PLY,STRL,LF,10/TRAY: Brand: MEDLINE

## (undated) DEVICE — SUTURE ETHLN SZ 4-0 L18IN NONABSORBABLE BLK L19MM PS-2 3/8 1667H

## (undated) DEVICE — BANDAGE COMPR M W6INXL10YD WHT BGE VELC E MTRX HK AND LOOP

## (undated) DEVICE — NEEDLE HYPO 30GA L0.5IN BGE POLYPR HUB S STL REG BVL STR

## (undated) DEVICE — TUBING SUCT 10FR MAL ALUM SHFT FN CAP VENT UNIV CONN W/ OBT

## (undated) DEVICE — Z INACTIVE NO SUPPLIER SOLUTIONIRRIG 3000ML 0.9% SOD CHL FLX CONT [79720808] [HOSPIRA WORLDWIDE INC]

## (undated) DEVICE — PADDING CAST N ADH 12X6 IN CRIMPED FINISH 100% COTTON WBRLII

## (undated) DEVICE — ELECTRODE PT RET AD L9FT HI MOIST COND ADH HYDRGEL CORDED

## (undated) DEVICE — ELECTRODE ELECSURG NDL 2.8 INX7.2 CM COAT INSUL EDGE

## (undated) DEVICE — GLOVE SURG SZ 85 L12IN FNGR THK94MIL STD WHT LTX FREE

## (undated) DEVICE — MINOR SET UP PACK: Brand: MEDLINE INDUSTRIES, INC.

## (undated) DEVICE — PENCIL ES CRD L10FT HND SWCHING ROCK SWCH W/ EDGE COAT BLDE

## (undated) DEVICE — WEREWOLF FLOW 90 COBLATION WAND: Brand: COBLATION

## (undated) DEVICE — STERILE LATEX POWDER-FREE SURGICAL GLOVESWITH NITRILE COATING: Brand: PROTEXIS

## (undated) DEVICE — PACK PROCEDURE SURG SURGERYARTHROSCOPY KNEE

## (undated) DEVICE — MANIFOLD SURG NEPTUNE WST MGMT

## (undated) DEVICE — HEAD AND NECK PACK: Brand: CONVERTORS

## (undated) DEVICE — SYRINGE MED 10ML LUERLOCK TIP W/O SFTY DISP

## (undated) DEVICE — SUTURE VICRYL + SZ 4 0 L18IN ABSRB WHT P 3 3 8 CIR REV CUT NDL VCP494G

## (undated) DEVICE — GLOVE SURG SZ 7 L12IN FNGR THK94MIL STD WHT ISOLEX LTX FREE

## (undated) DEVICE — OCCLUSIVE GAUZE STRIP,3% BISMUTH TRIBROMOPHENATE IN PETROLATUM BLEND: Brand: XEROFORM

## (undated) DEVICE — GLOVE SURG SZ 75 L12IN FNGR THK94MIL STD WHT LTX FREE